# Patient Record
Sex: FEMALE | Race: WHITE | NOT HISPANIC OR LATINO | Employment: FULL TIME | URBAN - METROPOLITAN AREA
[De-identification: names, ages, dates, MRNs, and addresses within clinical notes are randomized per-mention and may not be internally consistent; named-entity substitution may affect disease eponyms.]

---

## 2024-01-25 ENCOUNTER — EVALUATION (OUTPATIENT)
Dept: PHYSICAL THERAPY | Facility: CLINIC | Age: 29
End: 2024-01-25
Payer: COMMERCIAL

## 2024-01-25 VITALS — DIASTOLIC BLOOD PRESSURE: 70 MMHG | SYSTOLIC BLOOD PRESSURE: 120 MMHG

## 2024-01-25 DIAGNOSIS — M54.6 ACUTE MIDLINE THORACIC BACK PAIN: Primary | ICD-10-CM

## 2024-01-25 PROCEDURE — 97110 THERAPEUTIC EXERCISES: CPT

## 2024-01-25 PROCEDURE — 97161 PT EVAL LOW COMPLEX 20 MIN: CPT

## 2024-01-25 NOTE — PROGRESS NOTES
PT Re-Evaluation     Today's date: 2024  Patient name: Key Pinto  : 1995  MRN: [id number]  Referring provider: Ilana Martin*  Dx:   Encounter Diagnosis     ICD-10-CM    1. Acute midline thoracic back pain  M54.6           Start Time: 1000  Stop Time: 1045  Total time in clinic (min): 45 minutes    Assessment  Assessment details: Key is a 27 yo female presenting to OP PT secondary to hx of T/S compression fracture with onset 24. Pt reports functional limitations as follows difficulty with prolonged standing. She is currently on lifting restrictions, and notes pain with lifting overhead, twisting/turning, and forward bending. Pt demonstrates postural deficits in sitting, T/S, L/S  AROM deficits, UE strength deficits. Pt would benefit from skilled PT to address stated deficits and improve return to PLOF, to improve ease with return to work activities.     Impairments: abnormal or restricted ROM, activity intolerance, impaired physical strength and pain with function    Symptom irritability: moderateUnderstanding of Dx/Px/POC: excellent  Goals  Pt will demonstrate [place] with progressive home exercise program to improve carryover and decrease recurrence of symptoms.  Pt will demonstrate 20% improvement in FOTO score to increase tolerance to functional activities   Pt will demonstrate 10-20 deg improvement in lumbar AROM to increase tolerance to reaching for items off the floor and improve ease with turning  Pt will demonstrate tolerance to lifting medium weights (up to 15Ibs) with min to no discomfort to improve ease with carrying groceries  Pt will demonstrate 5/5 in LE strength to allow for improved tolerance to prolonged amb/standing  Pt will demonstrate [place] with progressive home exercise program to assist with PT carry over and prevent recurrence of symptoms in the future   Pt will demonstrate 4+/5 in UE strength to allow for improved tolerance to lifting  items overhead.   Pt will demonstrate 4+/5 in periscap strength to improve posture in sitting.      Plan  Patient would benefit from: PT eval and skilled physical therapy  Planned modality interventions: TENS, cryotherapy, manual electrical stimulation, low level laser therapy and thermotherapy: hydrocollator packs  Planned therapy interventions: IASTM, joint mobilization, massage, manual therapy, neuromuscular re-education, orthotic fitting/training, patient education, strengthening, stretching, therapeutic activities, therapeutic exercise and flexibility  Frequency: 2x week  Duration in weeks: 8  Plan of Care beginning date: 2024  Plan of Care expiration date: 3/27/2024  Treatment plan discussed with: patient        Subjective Evaluation    History of Present Illness  Date of onset: 2024  Mechanism of injury: trauma  Mechanism of injury: Key presents to OP PT secondary to slip down stairs hitting her back twice on stairs on 24. X-ray revealed vertebral compression fracture T7-T8. Pt is currently under restrictions No lifting >25Ibs, no pushing, no pulling, no excessive twisting. Pt notes discomfort when forward bending, lifting dishes, and with prolonged standing. She additionally reports difficulty with holding items overhead and turning/twisting.  Pt is a  and is required to lift >25Ibs, currently she is out of work. 25 pt is given potential clearance to return but will obtain follow up X-ray. Pt trials heat/ice for discomfort and intermittently will take OTC medication. Pt has history of chronic low back unrelated to fall, right sided of LB. Denies any numbness/tingling or referral sx.  Quality of life: good    Patient Goals  Patient goals for therapy: increased strength, independence with ADLs/IADLs, decreased pain and return to work  Patient goal: Return  Pain  Current pain ratin  At best pain rating: 3  At worst pain ratin  Quality: pressure, pulling and  burning  Relieving factors: ice  Aggravating factors: sitting, standing, walking and lifting    Social Support  Lives in: one-story house  Lives with: spouse    Employment status: not working    Diagnostic Tests  X-ray: abnormal  Treatments  No previous or current treatments  Current treatment: medication      Objective     Palpation   Left   Tenderness of the middle trapezius and thoracic paraspinals.     Right   Tenderness of the middle trapezius and thoracic paraspinals.     Active Range of Motion   Cervical/Thoracic Spine       Thoracic    Left lateral flexion:  with pain Restriction level: moderate  Right lateral flexion:  with pain Restriction level: maximal  Left rotation:  Restriction level: moderate  Right rotation:  with pain Restriction level: moderate  Left Shoulder   Normal active range of motion    Right Shoulder   Normal active range of motion    Lumbar   Flexion:  Restriction level: moderate  Extension:  Restriction level: moderate  Left lateral flexion:  Restriction level: minimal  Right lateral flexion:  Restriction level: minimal  Left rotation:  Restriction level: minimal  Right rotation:  Restriction level: minimal    Strength/Myotome Testing     Left Shoulder     Planes of Motion   Flexion: 4   Extension: 4   Abduction: 4   External rotation at 90°: 4   External rotation BTH: 4   Internal rotation at 90°: 4   Internal rotation BTB: 4     Isolated Muscles   Biceps: 4   Lower trapezius: 3+   Middle trapezius: 4-   Rhomboids: 4-   Triceps: 4     Right Shoulder     Planes of Motion   Flexion: 4   Extension: 4   Abduction: 4   External rotation at 90°: 4   External rotation BTH: 4   Internal rotation at 90°: 4   Internal rotation BTB: 4     Isolated Muscles   Biceps: 4   Lower trapezius: 3+   Middle trapezius: 4-   Rhomboids: 4-   Triceps: 4              Precautions: History reviewed. No pertinent past medical history. T7-T8 compression fracture       Manuals 1/25            P to A L/S                           STM  paraspinalis                          Neuro Re-Ed             TA hold 10''x10            TA 90/90 hold with alt arms 5''x10             TA hold with MB flex             Dead bug             Wall Ts, Ys  2x10            Jess pose             Quad donkey kicks             Quad Fire hydtrant                                                                 Ther Ex             UBE or BIke             Ball roll outs in sitting             Tband Ws             LTR             Bilateral ER/no monies             H abduction              Bridges             SLR             Clamshells             Tband H,M,L rows             Quadruped upper trunk rotation                                        Ther Activity                                       Gait Training                                       Modalities

## 2024-01-25 NOTE — LETTER
2024      No Recipients    Patient: Key Pinto   YOB: 1995   Date of Visit: 2024     Encounter Diagnosis     ICD-10-CM    1. Acute midline thoracic back pain  M54.6           Dear Dr. Martin:    Thank you for your recent referral of Key Pinto. Please review the attached evaluation summary from Key's recent visit.     Please verify that you agree with the plan of care by signing the attached order.     If you have any questions or concerns, please do not hesitate to call.     I sincerely appreciate the opportunity to share in the care of one of your patients and hope to have another opportunity to work with you in the near future.       Sincerely,    Janna Lopez, PT      Referring Provider:      I certify that I have read the below Plan of Care and certify the need for these services furnished under this plan of treatment while under my care.                    CHESTER Fletcher  [address] [address]  Via Fax: [phone]          PT Re-Evaluation     Today's date: 2024  Patient name: Key Pinto  : 1995  MRN: [id number]  Referring provider: Ilana Martin*  Dx:   Encounter Diagnosis     ICD-10-CM    1. Acute midline thoracic back pain  M54.6           Start Time: 1000  Stop Time: 1045  Total time in clinic (min): 45 minutes    Assessment  Assessment details: Key is a 29 yo female presenting to OP PT secondary to hx of T/S compression fracture with onset 24. Pt reports functional limitations as follows difficulty with prolonged standing. She is currently on lifting restrictions, and notes pain with lifting overhead, twisting/turning, and forward bending. Pt demonstrates postural deficits in sitting, T/S, L/S  AROM deficits, UE strength deficits. Pt would benefit from skilled PT to address stated deficits and improve return to PLOF, to improve ease with return to work activities.     Impairments:  abnormal or restricted ROM, activity intolerance, impaired physical strength and pain with function    Symptom irritability: moderateUnderstanding of Dx/Px/POC: excellent  Goals  Pt will demonstrate [place] with progressive home exercise program to improve carryover and decrease recurrence of symptoms.  Pt will demonstrate 20% improvement in FOTO score to increase tolerance to functional activities   Pt will demonstrate 10-20 deg improvement in lumbar AROM to increase tolerance to reaching for items off the floor and improve ease with turning  Pt will demonstrate tolerance to lifting medium weights (up to 15Ibs) with min to no discomfort to improve ease with carrying groceries  Pt will demonstrate 5/5 in LE strength to allow for improved tolerance to prolonged amb/standing  Pt will demonstrate [place] with progressive home exercise program to assist with PT carry over and prevent recurrence of symptoms in the future   Pt will demonstrate 4+/5 in UE strength to allow for improved tolerance to lifting items overhead.   Pt will demonstrate 4+/5 in periscap strength to improve posture in sitting.      Plan  Patient would benefit from: PT eval and skilled physical therapy  Planned modality interventions: TENS, cryotherapy, manual electrical stimulation, low level laser therapy and thermotherapy: hydrocollator packs  Planned therapy interventions: IASTM, joint mobilization, massage, manual therapy, neuromuscular re-education, orthotic fitting/training, patient education, strengthening, stretching, therapeutic activities, therapeutic exercise and flexibility  Frequency: 2x week  Duration in weeks: 8  Plan of Care beginning date: 1/25/2024  Plan of Care expiration date: 3/27/2024  Treatment plan discussed with: patient        Subjective Evaluation    History of Present Illness  Date of onset: 1/8/2024  Mechanism of injury: trauma  Mechanism of injury: Key presents to OP PT secondary to slip down stairs  hitting her back twice on stairs on 24. X-ray revealed vertebral compression fracture T7-T8. Pt is currently under restrictions No lifting >25Ibs, no pushing, no pulling, no excessive twisting. Pt notes discomfort when forward bending, lifting dishes, and with prolonged standing. She additionally reports difficulty with holding items overhead and turning/twisting.  Pt is a  and is required to lift >25Ibs, currently she is out of work. 25 pt is given potential clearance to return but will obtain follow up X-ray. Pt trials heat/ice for discomfort and intermittently will take OTC medication. Pt has history of chronic low back unrelated to fall, right sided of LB. Denies any numbness/tingling or referral sx.  Quality of life: good    Patient Goals  Patient goals for therapy: increased strength, independence with ADLs/IADLs, decreased pain and return to work  Patient goal: Return  Pain  Current pain ratin  At best pain rating: 3  At worst pain ratin  Quality: pressure, pulling and burning  Relieving factors: ice  Aggravating factors: sitting, standing, walking and lifting    Social Support  Lives in: one-story house  Lives with: spouse    Employment status: not working    Diagnostic Tests  X-ray: abnormal  Treatments  No previous or current treatments  Current treatment: medication      Objective     Palpation   Left   Tenderness of the middle trapezius and thoracic paraspinals.     Right   Tenderness of the middle trapezius and thoracic paraspinals.     Active Range of Motion   Cervical/Thoracic Spine       Thoracic    Left lateral flexion:  with pain Restriction level: moderate  Right lateral flexion:  with pain Restriction level: maximal  Left rotation:  Restriction level: moderate  Right rotation:  with pain Restriction level: moderate  Left Shoulder   Normal active range of motion    Right Shoulder   Normal active range of motion    Lumbar   Flexion:  Restriction level:  moderate  Extension:  Restriction level: moderate  Left lateral flexion:  Restriction level: minimal  Right lateral flexion:  Restriction level: minimal  Left rotation:  Restriction level: minimal  Right rotation:  Restriction level: minimal    Strength/Myotome Testing     Left Shoulder     Planes of Motion   Flexion: 4   Extension: 4   Abduction: 4   External rotation at 90°: 4   External rotation BTH: 4   Internal rotation at 90°: 4   Internal rotation BTB: 4     Isolated Muscles   Biceps: 4   Lower trapezius: 3+   Middle trapezius: 4-   Rhomboids: 4-   Triceps: 4     Right Shoulder     Planes of Motion   Flexion: 4   Extension: 4   Abduction: 4   External rotation at 90°: 4   External rotation BTH: 4   Internal rotation at 90°: 4   Internal rotation BTB: 4     Isolated Muscles   Biceps: 4   Lower trapezius: 3+   Middle trapezius: 4-   Rhomboids: 4-   Triceps: 4              Precautions: History reviewed. No pertinent past medical history. T7-T8 compression fracture       Manuals 1/25            P to A L/S                          STM  paraspinalis                          Neuro Re-Ed             TA hold 10''x10            TA 90/90 hold with alt arms 5''x10             TA hold with MB flex             Dead bug             Wall Ts, Ys  2x10            Jess pose             Quad donkey kicks             Quad Fire hydtrant                                                                 Ther Ex             UBE or BIke             Ball roll outs in sitting             Tband Ws             LTR             Bilateral ER/no monies             H abduction              Bridges             SLR             Clamshells             Tband H,M,L rows             Quadruped upper trunk rotation                                        Ther Activity                                       Gait Training                                       Modalities

## 2024-01-30 ENCOUNTER — OFFICE VISIT (OUTPATIENT)
Dept: PHYSICAL THERAPY | Facility: CLINIC | Age: 29
End: 2024-01-30
Payer: COMMERCIAL

## 2024-01-30 DIAGNOSIS — M54.6 ACUTE MIDLINE THORACIC BACK PAIN: Primary | ICD-10-CM

## 2024-01-30 PROCEDURE — 97110 THERAPEUTIC EXERCISES: CPT

## 2024-01-30 PROCEDURE — 97112 NEUROMUSCULAR REEDUCATION: CPT

## 2024-01-30 NOTE — PROGRESS NOTES
Daily Note     Today's date: 2024  Patient name: Key Pinto  : 1995  MRN: [id number]  Referring provider: Ilana Martin*  Dx:   Encounter Diagnosis     ICD-10-CM    1. Acute midline thoracic back pain  M54.6           Start Time: 1527  Stop Time: 1610  Total time in clinic (min): 43 minutes    Subjective: Pt reports mild elevation in muscular soreness since beginning exercise, but denies any sig pain. She reports no pain at the start of her session.     Objective: See treatment diary below      Assessment:  Key tolerated treatment well with consistent cuing throughout. TE's were performed with increased reps and increased resistance. New TE's were demonstrated with proper technique, and tolerated well. Following treatment, the patient demonstrated fatigue post treatment, exhibited good technique with therapeutic exercises, and would benefit from continued PT.         Plan: Continue per plan of care.      Precautions: History reviewed. No pertinent past medical history. T7-T8 compression fracture   Date           Visit # 1 2          FOTO done           Re-eval                  Manuals            P to A L/S                          STM  paraspinalis  FH L medial scap                        Neuro Re-Ed             TA hold 10''x10 10''x15           TA 90/90 hold with alt arms 5''x10  5''x20           TA hold with MB flex             Dead bug             Wall Ts, Ys  2x10 Prone today 210                        Quad donkey kicks             Quad Fire hydtrant                                                                 Ther Ex             UBE or BIke  3'/3'           Ball roll outs in sitting             Tband Ws  GTB  2x10           LTR  10''x10           Bilateral ER/no monies  [id number]            H abduction              Bridges  5''x20           SLR             Clamshells             Tband H,M,L rows  Mid BTB 2x10                                                   Ther Activity                                       Gait Training                                       Modalities

## 2024-02-01 ENCOUNTER — OFFICE VISIT (OUTPATIENT)
Dept: PHYSICAL THERAPY | Facility: CLINIC | Age: 29
End: 2024-02-01
Payer: COMMERCIAL

## 2024-02-01 DIAGNOSIS — M54.6 ACUTE MIDLINE THORACIC BACK PAIN: Primary | ICD-10-CM

## 2024-02-01 PROCEDURE — 97112 NEUROMUSCULAR REEDUCATION: CPT

## 2024-02-01 PROCEDURE — 97110 THERAPEUTIC EXERCISES: CPT

## 2024-02-01 NOTE — PROGRESS NOTES
Daily Note     Today's date: 2024  Patient name: Key Pinto  : 1995  MRN: [id number]  Referring provider: Ilana Martin*  Dx:   Encounter Diagnosis     ICD-10-CM    1. Acute midline thoracic back pain  M54.6           Start Time: 1400  Stop Time: 1440  Total time in clinic (min): 40 minutes    Subjective: Pt reports continued elevated soreness, along midline and between shoulder blades after exercise. She reports sx are around 5/10 today, which subsided from yesterday.      Objective: See treatment diary below      Assessment:  Key tolerated treatment well with consistent cuing throughout. TE's were performed with the same resistance and reps. No new TE's were performed today. Following treatment, the patient demonstrated fatigue post treatment, exhibited good technique with therapeutic exercises, and would benefit from continued PT.         Plan: Continue per plan of care.      Precautions: History reviewed. No pertinent past medical history. T7-T8 compression fracture   Date          Visit # 1 2 3         FOTO done           Re-eval                  Manuals           P to A L/S                          STM  paraspinalis  FH L medial scap FH                       Neuro Re-Ed             TA hold 10''x10 10''x15 10''x15          TA 90/90 hold with alt arms 5''x10  5''x20 5''x20          TA hold with MB flex             Dead bug             Wall Ts, Ys  2x10 Prone today 2x10 Wall today 2x10 ea                       Quad donkey kicks             Quad Fire hydtrant             TA hold with [place]   2x10                                                 Ther Ex             UBE or BIke  3'/3' 3'/3'          Ball roll outs in sitting             Tband Ws  GTB  2x10 GTB 2x10          LTR  10''x10 5-10''x10          Bilateral ER/no monies  [id number]  GTB 2x10 supine          H abduction              Bridges  5''x20 5''x20          SLR             Clamshells              Tband H,M,L rows  Mid BTB 2x10 Mid/low BTB 2x10 ea                                                 Ther Activity                                       Gait Training                                       Modalities

## 2024-02-06 ENCOUNTER — OFFICE VISIT (OUTPATIENT)
Dept: PHYSICAL THERAPY | Facility: CLINIC | Age: 29
End: 2024-02-06
Payer: COMMERCIAL

## 2024-02-06 DIAGNOSIS — M54.6 ACUTE MIDLINE THORACIC BACK PAIN: Primary | ICD-10-CM

## 2024-02-06 PROCEDURE — 97112 NEUROMUSCULAR REEDUCATION: CPT

## 2024-02-06 PROCEDURE — 97110 THERAPEUTIC EXERCISES: CPT

## 2024-02-06 NOTE — PROGRESS NOTES
Daily Note     Today's date: 2024  Patient name: Key Pinto  : 1995  MRN: [id number]  Referring provider: Ilana Martin*  Dx:   Encounter Diagnosis     ICD-10-CM    1. Acute midline thoracic back pain  M54.6           Start Time: 1400  Stop Time: 1445  Total time in clinic (min): 45 minutes    Subjective: Pt followed up with MD to obtain clearance for activities and work related activities. She reports job requires physical activities      Objective: See treatment diary below      Assessment: Tolerated treatment well. Exercises were progressed for added resistance with vignesh this session and this was tolerated well. PT trials cupping this session, with verbal agreement from patient to complete. Skin check was completed and pt tolerates this well. Patient demonstrated fatigue post treatment, exhibited good technique with therapeutic exercises, and would benefit from continued PT      Plan: Continue per plan of care.      Precautions: History reviewed. No pertinent past medical history. T7-T8 compression fracture   Date          Visit # 1 2 3 4         FOTO done            Re-eval                   Manuals          P to A L/S                          STM  paraspinalis  FH L medial scap  FH         Mcpt     5 min          Neuro Re-Ed             TA hold 10''x10 10''x15 10''x15 NV         TA 90/90 hold with alt arms 5''x10  5''x20 5''x20 NV         TA hold with MB flex             Dead bug             Wall Ts, Ys  2x10 Prone today 2x10 Wall today 2x10 ea Prone 2x10 ea                      Quad donkey kicks             Quad Fire hydtrant             TA hold with [place]   2x10 NV                                                Ther Ex             UBE or BIke  3'/3' 3'/3' 3'/3'         Ball roll outs in sitting             Tband Ws  GTB  2x10 GTB 2x10 5# 2x10 Vignesh         LTR  10''x10 5-10''x10 Home         Bilateral ER/no monies  [id number]  GTB 2x10 supine ER  Vignesh  5# b/l         H abduction              Bridges  5''x20 5''x20 NV         SLR             [place]             Tband H,M,L rows  Mid BTB 2x10 Mid/low BTB 2x10 ea Vignesh 15# rows  10# ext 2x10 ea          Pall off press    5# 1x10 ea                                   Ther Activity                                       Gait Training                                       Modalities

## 2024-02-08 ENCOUNTER — OFFICE VISIT (OUTPATIENT)
Dept: PHYSICAL THERAPY | Facility: CLINIC | Age: 29
End: 2024-02-08
Payer: COMMERCIAL

## 2024-02-08 DIAGNOSIS — M54.6 ACUTE MIDLINE THORACIC BACK PAIN: Primary | ICD-10-CM

## 2024-02-08 PROCEDURE — 97110 THERAPEUTIC EXERCISES: CPT

## 2024-02-08 PROCEDURE — 97112 NEUROMUSCULAR REEDUCATION: CPT

## 2024-02-08 NOTE — PROGRESS NOTES
"Daily Note     Today's date: 2024  Patient name: Key Pinto  : 1995  MRN: [id number]  Referring provider: Ilana Martin*  Dx:   Encounter Diagnosis     ICD-10-CM    1. Acute midline thoracic back pain  M54.6           Start Time: 1530  Stop Time: 1620  Total time in clinic (min): 50 minutes    Subjective: Patient states that mid back feel sore where she had the original injury.      Objective: See treatment diary below      Assessment: Tolerated treatment well. Good core control with cues this visit. Good carry over with cues for scapular control. Some core and mid trap fatigue post session. Patient would benefit from continued PT to improve T-S strength.    Plan: Continue per plan of care.  GIVE FOTO NV.     Precautions: History reviewed. No pertinent past medical history. T7-T8 compression fracture   Date         Visit # 1 2 3 4 5        FOTO done            Re-eval                   Manuals         P to A L/S                          STM  paraspinalis  FH L medial scap  FH L medial scap, L T-S paraspinals        Mcpt     5 min          Neuro Re-Ed             TA hold 10''x10 10''x15 10''x15 NV 10\"x20        TA 90/90 hold with alt arms 5''x10  5''x20 5''x20 NV x20        TA hold with MB flex             Dead bug             Wall Ts, Ys  2x10 Prone today 2x10 Wall today 2x10 ea Prone 2x10 ea Prone 2x10 ea                     Quad donkey kicks             Quad Fire hydtrant             TA hold with March   2x10 NV 2x10, 5\"                                               Ther Ex             UBE or BIke  3'/3' 3'/3' 3'/3' 3'/3'        Ball roll outs in sitting             Tband Ws  GTB  2x10 GTB 2x10 5# 2x10 [place]         LTR  10''x10 5-10''x10 Home NP        Bilateral ER/no monies  [id number]  GTB 2x10 supine ER Vignesh  5# b/l         H abduction              Bridges  5''x20 5''x20 NV         SLR             Clamshells             Tband H,M,L rows  " Mid BTB 2x10 Mid/low BTB 2x10 ea [place] 15# rows  10# ext 2x10 ea          Pall off press    5# 1x10 ea                                   Ther Activity                                       Gait Training                                       Modalities

## 2024-02-13 ENCOUNTER — APPOINTMENT (OUTPATIENT)
Dept: PHYSICAL THERAPY | Facility: CLINIC | Age: 29
End: 2024-02-13
Payer: COMMERCIAL

## 2024-02-13 ENCOUNTER — OFFICE VISIT (OUTPATIENT)
Dept: PHYSICAL THERAPY | Facility: CLINIC | Age: 29
End: 2024-02-13
Payer: COMMERCIAL

## 2024-02-13 DIAGNOSIS — M54.6 ACUTE MIDLINE THORACIC BACK PAIN: Primary | ICD-10-CM

## 2024-02-13 PROCEDURE — 97112 NEUROMUSCULAR REEDUCATION: CPT | Performed by: PHYSICAL THERAPIST

## 2024-02-13 PROCEDURE — 97140 MANUAL THERAPY 1/> REGIONS: CPT | Performed by: PHYSICAL THERAPIST

## 2024-02-13 PROCEDURE — 97110 THERAPEUTIC EXERCISES: CPT | Performed by: PHYSICAL THERAPIST

## 2024-02-13 NOTE — PROGRESS NOTES
"Daily Note     Today's date: 2024  Patient name: Key Pinto  : 1995  MRN: [id number]  Referring provider: Ilana Martin*  Dx:   Encounter Diagnosis     ICD-10-CM    1. Acute midline thoracic back pain  M54.6                      Subjective: Felt sore after last session, but doing pretty good overall. Feels like she can do more, but is nervous that she'll do too much.       Objective: See treatment diary below      Assessment: Tolerated treatment well. Patient would benefit from continued PT. Tolerated session well. Mild pain intermittently, but felt better post tx. Did well w/ paloff press      Plan: Continue per plan of care.      Precautions: History reviewed. No pertinent past medical history. T7-T8 compression fracture   Date        Visit # 1 2 3 4 5        FOTO done            Re-eval                   Manuals        P to A L/S                          STM  paraspinalis  FH L medial scap  FH L medial scap, L T-S paraspinals L medial scap, L T-S paraspinals       Mcpt     5 min          Neuro Re-Ed             TA hold 10''x10 10''x15 10''x15 NV 10\"x20 10''x15       TA 90/90 hold with alt arms 5''x10  5''x20 5''x20 NV x20 x20       TA hold with MB flex             Dead bug             Wall Ts, Ys  2x10 Prone today 2x10 Wall today 2x10 ea Prone 2x10 ea Prone 2x10 ea Prone 2x10 ea                    Quad donkey kicks             Quad Fire hydtrant             TA hold with March   2x10 NV 2x10, 5\" 2x10, 5\"                                              Ther Ex             UBE or BIke  3'/3' 3'/3' 3'/3' 3'/3' 3'/3'       Ball roll outs in sitting             Tband Ws  GTB  2x10 GTB 2x10 5# 2x10 Vignesh         LTR  10''x10 5-10''x10 Home NP        Bilateral ER/no monies  [id number]  GTB 2x10 supine ER [place]  5# b/l  GTB 2x10 standing       H abduction              Bridges  5''x20 5''x20 NV         SLR             Clamshells             Tband " H,M,L rows  Mid BTB 2x10 Mid/low BTB 2x10 ea [place] 15# rows  10# ext 2x10 ea   Vignesh 10# rows  10# ext 2x10 ea        Pall off press    5# 1x10 ea  5# 2x10 ea                                 Ther Activity                                       Gait Training                                       Modalities

## 2024-02-15 ENCOUNTER — EVALUATION (OUTPATIENT)
Dept: PHYSICAL THERAPY | Facility: CLINIC | Age: 29
End: 2024-02-15
Payer: COMMERCIAL

## 2024-02-15 DIAGNOSIS — M54.6 ACUTE MIDLINE THORACIC BACK PAIN: Primary | ICD-10-CM

## 2024-02-15 PROCEDURE — 97112 NEUROMUSCULAR REEDUCATION: CPT

## 2024-02-15 PROCEDURE — 97110 THERAPEUTIC EXERCISES: CPT

## 2024-02-15 NOTE — PROGRESS NOTES
PT Re-Evaluation     Today's date: 2/15/2024  Patient name: Key Pinto  : 1995  MRN: [id number]  Referring provider: Ilana Martin*  Dx:   Encounter Diagnosis     ICD-10-CM    1. Acute midline thoracic back pain  M54.6           Start Time: 1530  Stop Time: 1615  Total time in clinic (min): 45 minutes    Assessment  Assessment details: Key is a 27 yo female presenting to OP PT secondary to hx of T/S compression fracture with onset 24. Pt reports she has tried lifting trash cans at home, and additionally note difficulty with lifting up to 10ibs (lifting her vacuum). She reports she is able to standing/walk for up to 20 minutes before onset of pain. Pt demonstrates 1/2 grade improvement in B/L UE strength and general improvement in thoracic movement compared to initial Eval. She does continue to present with thoracic extension AROM deficits, Right rotation and Left side bending AROM deficit Pt would benefit from skilled PT to address stated deficits and improve return to PLOF, to improve ease with return to work activities.     Impairments: abnormal or restricted ROM, activity intolerance, impaired physical strength and pain with function    FUNCTIONAL TESTING 2/15/24 RE:  Pushing/pulling  10#, 2x10 reps  Standing 20 min before onset of pain  Repeated forward bending 20 reps  Sustained forward bending NA   Lifting from Ground to waist 15 #, 10 reps; 10#, 20 reps (elevated pain with 15# after 6 reps)  Lifting from Ground to shoulder 10-15#, 10-20 reps (pain with 15# after 5 reps)  Overhead lifting 10-15#, 10-20 reps (elevated pain after 5 reps)     Symptom irritability: moderateUnderstanding of Dx/Px/POC: excellent  Goals  Pt will demonstrate [place] with progressive home exercise program to improve carryover and decrease recurrence of symptoms.  Pt will demonstrate 20% improvement in FOTO score to increase tolerance to functional activities   Pt will demonstrate 10-20 deg  improvement in lumbar AROM to increase tolerance to reaching for items off the floor and improve ease with turning  Pt will demonstrate tolerance to lifting medium weights (up to 15Ibs) with min to no discomfort to improve ease with carrying groceries  Pt will demonstrate 5/5 in LE strength to allow for improved tolerance to prolonged amb/standing  Pt will demonstrate [place] with progressive home exercise program to assist with PT carry over and prevent recurrence of symptoms in the future   Pt will demonstrate 4+/5 in UE strength to allow for improved tolerance to lifting items overhead.   Pt will demonstrate 4+/5 in periscap strength to improve posture in sitting.      Plan  Patient would benefit from: PT eval and skilled physical therapy  Planned modality interventions: TENS, cryotherapy, manual electrical stimulation, low level laser therapy and thermotherapy: hydrocollator packs  Planned therapy interventions: IASTM, joint mobilization, massage, manual therapy, neuromuscular re-education, orthotic fitting/training, patient education, strengthening, stretching, therapeutic activities, therapeutic exercise and flexibility  Frequency: 2x week  Duration in weeks: 8  Plan of Care beginning date: 1/25/2024  Plan of Care expiration date: 3/27/2024  Treatment plan discussed with: patient        Subjective Evaluation  RE 2/15/24  Key has been in therapy for a total of 7 visits beginning 1/25/24 to 2/15/24 for treatment of thoracic back pain. She reports overall improvement in tolerance to daily activities such as vacuuming, putting away dishes, and reaching overhead with less discomfort. She continues to not discomfort with repeated or sustained flexion, and lifting >15#.     History of Present Illness  Date of onset: 1/8/2024  Mechanism of injury: trauma  Mechanism of injury: Key presents to OP PT secondary to slip down stairs hitting her back twice on stairs on 1/08/24. X-ray revealed vertebral  compression fracture T7-T8. Pt is currently under restrictions No lifting >25Ibs, no pushing, no pulling, no excessive twisting. Pt notes discomfort when forward bending, lifting dishes, and with prolonged standing. She additionally reports difficulty with holding items overhead and turning/twisting.  Pt is a  and is required to lift >25Ibs, currently she is out of work. 25 pt is given potential clearance to return but will obtain follow up X-ray. Pt trials heat/ice for discomfort and intermittently will take OTC medication. Pt has history of chronic low back unrelated to fall, right sided of LB. Denies any numbness/tingling or referral sx.  Quality of life: good    Patient Goals  Patient goals for therapy: increased strength, independence with ADLs/IADLs, decreased pain and return to work  Patient goal: Return  Pain  Current pain ratin  At best pain rating: 3  At worst pain ratin  Quality: pressure, pulling and burning  Relieving factors: ice  Aggravating factors: sitting, standing, walking and lifting        Objective     Palpation   Left   Tenderness of the middle trapezius and thoracic paraspinals.     Right   Tenderness of the middle trapezius and thoracic paraspinals.     Active Range of Motion   Cervical/Thoracic Spine       Thoracic  Flexion WNL  Extension mod  Left lateral flexion:  with pain Restriction level: mod  Right lateral flexion:  with pain Restriction level: min  Left rotation:  Restriction level: moderate  Right rotation:  with pain Restriction level: moderate  Left Shoulder   Normal active range of motion    Right Shoulder   Normal active range of motion    Lumbar   Flexion:  Restriction level: WNL  Extension:  Restriction level: moderate  Left lateral flexion:  Restriction level: minimal  Right lateral flexion:  Restriction level: minimal  Left rotation:  Restriction level: minimal   Right rotation:  Restriction level: minimal    Strength/Myotome Testing     Left  "Shoulder     Planes of Motion   Flexion: 4+  Extension: 4+   Abduction: 4 +  External rotation at 90°: 4   External rotation BTH: 4   Internal rotation at 90°: 4   Internal rotation BTB: 4     Isolated Muscles   Biceps: 4   Lower trapezius: 4-  Middle trapezius: 4   Rhomboids: 4  Triceps: 4+    Right Shoulder     Planes of Motion   Flexion: 4+  Extension: 4+  Abduction: 4+  External rotation at 90°: 4   External rotation BTH: 4   Internal rotation at 90°: 4   Internal rotation BTB: 4     Isolated Muscles   Biceps: 4   Lower trapezius: 3+   Middle trapezius: 4-   Rhomboids: 4-   Triceps: 4     Flowsheet Rows      Flowsheet Row Most Recent Value   PT/OT G-Codes    Current Score 58   Projected Score 78        JOB REQUIREMENTS:  Standing 8-10 hours   Repeated unassisted lifting of 20Ibs overhead   Pushing/pulling 20Ibs    FUNCTIONAL TESTING 2/15/24 RE:    Pushing/pulling  10#, 2x10 reps  Standing 20 min  Repeated forward bending 20 reps  Sustained forward bending NA   Lifting from Ground to waist 15 #, 10 reps; 10#, 20 reps   Lifting from Ground to shoulder 10-15#, 10-20 reps (pain with 15# after 5 reps)  Overhead lifting 10-15#, 10-20 reps     Plan: Continue per plan of care.      Precautions: History reviewed. No pertinent past medical history. T7-T8 compression fracture   Date 1/25 1/30 2/1 2/6 2/8 2/13 2/15      Visit # 1 2 3 4 5 6 7      FOTO done      X      Re-eval       X            Manuals 1/25 1/30 2/1 2/6 2/8 2/13 2/15      P to A L/S                          STM  paraspinalis  FH L medial scap  FH L medial scap, L T-S paraspinals L medial scap, L T-S paraspinals NP      MFD    5 min          Neuro Re-Ed             TA hold 10''x10 10''x15 10''x15 NV 10\"x20 10''x15 NP      TA 90/90 hold with alt arms 5''x10  5''x20 5''x20 NV x20 x20 NV      TA hold with MB flex             Dead bug             Wall Ts, Ys  2x10 Prone today 2x10 Wall today 2x10 ea Prone 2x10 ea Prone 2x10 ea Prone 2x10 ea Prone 3 x10           " "         Quad donkey kicks             Quad Fire hydtrant             TA hold with March   2x10 NV 2x10, 5\" 2x10, 5\"                                              Ther Ex             UBE or BIke  3'/3' 3'/3' 3'/3' 3'/3' 3'/3' 3'/3'      Ball roll outs in sitting             Tband Ws  GTB  2x10 GTB 2x10 5# 2x10 [place]         LTR  10''x10 5-10''x10 Home NP        Bilateral ER/no monies  [id number]  GTB 2x10 supine ER [place]  5# b/l  GTB 2x10 standing ER Vignesh  5# b/l      H abduction              Bridges  5''x20 5''x20 NV         SLR             Clamshells             Tband H,M,L rows  Mid BTB 2x10 Mid/low BTB 2x10 ea [place] 15# rows  10# ext 2x10 ea   [place] 10# rows  10# ext 2x10 ea  Vignesh 15# rows  10# ext 2x10 ea       Pall off press    5# 1x10 ea  5# 2x10 ea Chest NAYLOR press 8#      T/S AROM ext, Sb, rot       5''x10      OH lift, Floor to chest       10#-15#      Ther Activity                                       Gait Training                                       Modalities                                                 "

## 2024-02-15 NOTE — LETTER
February 15, 2024      No Recipients    Patient: Key Pinto   YOB: 1995   Date of Visit: 2/15/2024     Encounter Diagnosis     ICD-10-CM    1. Acute midline thoracic back pain  M54.6           Dear Dr. Martin:    Thank you for your recent referral of Key Pinto. Please review the attached evaluation summary from Key's recent visit.     Please verify that you agree with the plan of care by signing the attached order.     If you have any questions or concerns, please do not hesitate to call.     I sincerely appreciate the opportunity to share in the care of one of your patients and hope to have another opportunity to work with you in the near future.       Sincerely,    Janna Lopez, PT      Referring Provider:      I certify that I have read the below Plan of Care and certify the need for these services furnished under this plan of treatment while under my care.                    CHESTER Fletcher  [address] [address]  Via Fax: [phone]          PT Re-Evaluation     Today's date: 2/15/2024  Patient name: Key Pinto  : 1995  MRN: [id number]  Referring provider: Ilana Martin*  Dx:   Encounter Diagnosis     ICD-10-CM    1. Acute midline thoracic back pain  M54.6           Start Time: 1530  Stop Time: 1615  Total time in clinic (min): 45 minutes    Assessment  Assessment details: Key is a 29 yo female presenting to OP PT secondary to hx of T/S compression fracture with onset 24. Pt reports she has tried lifting trash cans at home, and additionally note difficulty with lifting up to 10ibs (lifting her vacuum). She reports she is able to standing/walk for up to 20 minutes before onset of pain. Pt demonstrates 1/2 grade improvement in LE strength and general thoracic movement compared to initial Eval. She does continue to present with thoracic extension AROM deficits, Right rotation and Left side bending AROM deficit Pt  would benefit from skilled PT to address stated deficits and improve return to PLOF, to improve ease with return to work activities.     Impairments: abnormal or restricted ROM, activity intolerance, impaired physical strength and pain with function    Symptom irritability: moderateUnderstanding of Dx/Px/POC: excellent  Goals  Pt will demonstrate [place] with progressive home exercise program to improve carryover and decrease recurrence of symptoms.  Pt will demonstrate 20% improvement in FOTO score to increase tolerance to functional activities   Pt will demonstrate 10-20 deg improvement in lumbar AROM to increase tolerance to reaching for items off the floor and improve ease with turning  Pt will demonstrate tolerance to lifting medium weights (up to 15Ibs) with min to no discomfort to improve ease with carrying groceries  Pt will demonstrate 5/5 in LE strength to allow for improved tolerance to prolonged amb/standing  Pt will demonstrate [place] with progressive home exercise program to assist with PT carry over and prevent recurrence of symptoms in the future   Pt will demonstrate 4+/5 in UE strength to allow for improved tolerance to lifting items overhead.   Pt will demonstrate 4+/5 in periscap strength to improve posture in sitting.      Plan  Patient would benefit from: PT eval and skilled physical therapy  Planned modality interventions: TENS, cryotherapy, manual electrical stimulation, low level laser therapy and thermotherapy: hydrocollator packs  Planned therapy interventions: IASTM, joint mobilization, massage, manual therapy, neuromuscular re-education, orthotic fitting/training, patient education, strengthening, stretching, therapeutic activities, therapeutic exercise and flexibility  Frequency: 2x week  Duration in weeks: 8  Plan of Care beginning date: 1/25/2024  Plan of Care expiration date: 3/27/2024  Treatment plan discussed with: patient        Subjective Evaluation  RE  2/15/24  Key has been in therapy for a total of 7 visits beginning 24 to 2/15/24 for treatment of thoracic back pain. She reports overall improvement in tolerance to daily activities such as vacuuming, putting away dishes, and reaching overhead with less discomfort. She continues to not discomfort with repeated or sustained flexion, and lifting >15#.     History of Present Illness  Date of onset: 2024  Mechanism of injury: trauma  Mechanism of injury: Key presents to OP PT secondary to slip down stairs hitting her back twice on stairs on 24. X-ray revealed vertebral compression fracture T7-T8. Pt is currently under restrictions No lifting >25Ibs, no pushing, no pulling, no excessive twisting. Pt notes discomfort when forward bending, lifting dishes, and with prolonged standing. She additionally reports difficulty with holding items overhead and turning/twisting.  Pt is a  and is required to lift >25Ibs, currently she is out of work. 25 pt is given potential clearance to return but will obtain follow up X-ray. Pt trials heat/ice for discomfort and intermittently will take OTC medication. Pt has history of chronic low back unrelated to fall, right sided of LB. Denies any numbness/tingling or referral sx.  Quality of life: good    Patient Goals  Patient goals for therapy: increased strength, independence with ADLs/IADLs, decreased pain and return to work  Patient goal: Return  Pain  Current pain ratin  At best pain rating: 3  At worst pain ratin  Quality: pressure, pulling and burning  Relieving factors: ice  Aggravating factors: sitting, standing, walking and lifting        Objective     Palpation   Left   Tenderness of the middle trapezius and thoracic paraspinals.     Right   Tenderness of the middle trapezius and thoracic paraspinals.     Active Range of Motion   Cervical/Thoracic Spine       Thoracic  Flexion WNL  Extension mod  Left lateral flexion:  with pain  Restriction level: mod  Right lateral flexion:  with pain Restriction level: min  Left rotation:  Restriction level: moderate  Right rotation:  with pain Restriction level: moderate  Left Shoulder   Normal active range of motion    Right Shoulder   Normal active range of motion    Lumbar   Flexion:  Restriction level: WNL  Extension:  Restriction level: moderate  Left lateral flexion:  Restriction level: minimal  Right lateral flexion:  Restriction level: minimal  Left rotation:  Restriction level: minimal   Right rotation:  Restriction level: minimal    Strength/Myotome Testing     Left Shoulder     Planes of Motion   Flexion: 4+  Extension: 4+   Abduction: 4 +  External rotation at 90°: 4   External rotation BTH: 4   Internal rotation at 90°: 4   Internal rotation BTB: 4     Isolated Muscles   Biceps: 4   Lower trapezius: 4-  Middle trapezius: 4   Rhomboids: 4  Triceps: 4+    Right Shoulder     Planes of Motion   Flexion: 4+  Extension: 4+  Abduction: 4+  External rotation at 90°: 4   External rotation BTH: 4   Internal rotation at 90°: 4   Internal rotation BTB: 4     Isolated Muscles   Biceps: 4   Lower trapezius: 3+   Middle trapezius: 4-   Rhomboids: 4-   Triceps: 4     Flowsheet Rows      Flowsheet Row Most Recent Value   PT/OT G-Codes    Current Score 58   Projected Score 78        JOB REQUIREMENTS:  Standing 8-10 hours   Repeated unassisted lifting of 20Ibs overhead   Pushing/pulling 20Ibs    FUNCTIONAL TESTING 2/15/24 RE:    Pushing/pulling  10#, 2x10 reps  Standing 20 min  Repeated forward bending 20 reps  Sustained forward bending *** min   Lifting from Ground to waist 15 #, 10 reps; 10#, 20 reps   Lifting from Ground to shoulder 10-15#, 10-20 reps (pain with 15# after 5 reps)  Overhead lifting 10-15#, 10-20 reps     Plan: Continue per plan of care.      Precautions: History reviewed. No pertinent past medical history. T7-T8 compression fracture   Date 1/25 1/30 2/1 2/6 2/8 2/13 2/15      Visit # 1 2 3 4  "5 6 7      FOTO done      X      Re-eval       X            Manuals 1/25 1/30 2/1 2/6 2/8 2/13 2/15      P to A L/S                          STM  paraspinalis  FH L medial scap  FH L medial scap, L T-S paraspinals L medial scap, L T-S paraspinals NP      MFD    5 min          Neuro Re-Ed             TA hold 10''x10 10''x15 10''x15 NV 10\"x20 10''x15 NP      TA 90/90 hold with alt arms 5''x10  5''x20 5''x20 NV x20 x20 NV      TA hold with MB flex             Dead bug             Wall Ts, Ys  2x10 Prone today 2x10 Wall today 2x10 ea Prone 2x10 ea Prone 2x10 ea Prone 2x10 ea Prone 3 x10                    Quad donkey kicks             Quad Fire hydtrant             TA hold with March   2x10 NV 2x10, 5\" 2x10, 5\"                                              Ther Ex             UBE or BIke  3'/3' 3'/3' 3'/3' 3'/3' 3'/3' 3'/3'      Ball roll outs in sitting             Tband Ws  GTB  2x10 GTB 2x10 5# 2x10 Vignesh         LTR  10''x10 5-10''x10 Home NP        Bilateral ER/no monies  [id number]  GTB 2x10 supine ER [place]  5# b/l  GTB 2x10 standing ER [place]  5# b/l      H abduction              Bridges  5''x20 5''x20 NV         SLR             Clamshells             Tband H,M,L rows  Mid BTB 2x10 Mid/low BTB 2x10 ea Vignesh 15# rows  10# ext 2x10 ea   Vignesh 10# rows  10# ext 2x10 ea  [place] 15# rows  10# ext 2x10 ea       Pall off press    5# 1x10 ea  5# 2x10 ea Chest NAYLOR press 8#      T/S AROM ext, Sb, rot       5''x10      OH lift, Floor to chest       10#-15#      Ther Activity                                       Gait Training                                       Modalities                                                                    "

## 2024-02-20 ENCOUNTER — OFFICE VISIT (OUTPATIENT)
Dept: PHYSICAL THERAPY | Facility: CLINIC | Age: 29
End: 2024-02-20
Payer: COMMERCIAL

## 2024-02-20 DIAGNOSIS — M54.6 ACUTE MIDLINE THORACIC BACK PAIN: Primary | ICD-10-CM

## 2024-02-20 PROCEDURE — 97110 THERAPEUTIC EXERCISES: CPT

## 2024-02-20 PROCEDURE — 97112 NEUROMUSCULAR REEDUCATION: CPT

## 2024-02-20 NOTE — PROGRESS NOTES
"Daily Note     Today's date: 2024  Patient name: Key Pinto  : 1995  MRN: [id number]  Referring provider: Ilana Martin*  Dx:   Encounter Diagnosis     ICD-10-CM    1. Acute midline thoracic back pain  M54.6                      Subjective: Pt reports increase soreness in mid back after RE, but reports soreness subsided after 1-2 days. She denies any sig pain at the start of her session.       Objective: See treatment diary below      Assessment: Austin tolerated treatment well with consistent cuing throughout. TE's were performed with increased reps and increased resistance. New TE's were demonstrated with proper technique, and tolerated well. Following treatment, the patient demonstrated fatigue post treatment, exhibited good technique with therapeutic exercises, and would benefit from continued PT.         Plan: Continue per plan of care.      Plan: Continue per plan of care.      Precautions: History reviewed. No pertinent past medical history. T7-T8 compression fracture   Date  2/6 2/8 2/13 2/15 2/20     Visit # 1 2 3 4 5 6 7 8     FOTO done      X      Re-eval       X            Manuals  2/ 2/8 2/13 2/15 2/20     P to A L/S                          STM  paraspinalis  FH L medial scap  FH L medial scap, L T-S paraspinals L medial scap, L T-S paraspinals NP      MFD    5 min          Neuro Re-Ed             TA hold 10''x10 10''x15 10''x15 NV 10\"x20 10''x15 NP      TA 90/90 hold with alt arms 5''x10  5''x20 5''x20 NV x20 x20 NV      TA hold with MB flex             Dead bug             Wall Ts, Ys  2x10 Prone today 2x10 Wall today 2x10 ea Prone 2x10 ea Prone 2x10 ea Prone 2x10 ea Prone 3 x10  Prone 3x10                  Quad donkey kicks             Quad Fire hydtrant             TA hold with March   2x10 NV 2x10, 5\" 2x10, 5\"  OH hold 5# b/L w/ march                                            Ther Ex             UBE or BIke  3'/3' 3'/3' 3'/3' 3'/3' 3'/3' 3'/3' " 3'/3'     Ball roll outs in sitting             Tband Ws  GTB  2x10 GTB 2x10 5# 2x10 Vignesh    GTB 3x10     LTR  10''x10 5-10''x10 Home NP        Bilateral ER/no monies  [id number]  GTB 2x10 supine ER Vignesh  5# b/l  GTB 2x10 standing ER [place]  5# b/l ER Vignesh  5# b/l 2x10     H abduction              Bridges  5''x20 5''x20 NV         SLR             Clamshells             Tband H,M,L rows  Mid BTB 2x10 Mid/low BTB 2x10 ea [place] 15# rows  10# ext 2x10 ea   Vignesh 10# rows  10# ext 2x10 ea  Vignesh 15# rows  10# ext 2x10 ea  Vignesh 15# rows  10# ext 2x10 ea      Pall off press    5# 1x10 ea  5# 2x10 ea Chest  press 8# NV     T/S AROM ext, Sb, rot       5''x10 5''x15     OH lift, Floor to chest       10#-15# 5# B/L 2x10   Chair RDL 2x10     Ther Activity                                       Gait Training                                       Modalities

## 2024-02-23 ENCOUNTER — OFFICE VISIT (OUTPATIENT)
Dept: PHYSICAL THERAPY | Facility: CLINIC | Age: 29
End: 2024-02-23
Payer: COMMERCIAL

## 2024-02-23 DIAGNOSIS — M54.6 ACUTE MIDLINE THORACIC BACK PAIN: Primary | ICD-10-CM

## 2024-02-23 PROCEDURE — 97110 THERAPEUTIC EXERCISES: CPT

## 2024-02-23 PROCEDURE — 97112 NEUROMUSCULAR REEDUCATION: CPT

## 2024-02-23 NOTE — PROGRESS NOTES
"Daily Note     Today's date: 2024  Patient name: Key Pinto  : 1995  MRN: [id number]  Referring provider: Ilana Martin*  Dx:   Encounter Diagnosis     ICD-10-CM    1. Acute midline thoracic back pain  M54.6           Start Time: 0900  Stop Time: 0940  Total time in clinic (min): 40 minutes    Subjective: Pt reports no soreness at the start of her session and notes she feels better than her previous session. Pt reports no medical updates.       Objective: See treatment diary below      Assessment:  Key tolerated treatment well with consistent cuing throughout. TE's were performed with increased reps and increased resistance. New TE's were demonstrated with proper technique, and tolerated well. Following treatment, the patient demonstrated fatigue post treatment, exhibited good technique with therapeutic exercises, and would benefit from continued PT.       Plan: Continue per plan of care.      Plan: Continue per plan of care.      Precautions: History reviewed. No pertinent past medical history. T7-T8 compression fracture   Date  2/ 2/8 2/13 2/15 2/20 2/23    Visit # 1 2 3 4 5 6 7 8 9    FOTO done      X      Re-eval       X            Manuals  2/6 2/8 2/13 2/15 2/20 2/23    P to A L/S                          STM  paraspinalis  FH L medial scap  FH L medial scap, L T-S paraspinals L medial scap, L T-S paraspinals NP      MFD    5 min          Neuro Re-Ed             TA hold 10''x10 10''x15 10''x15 NV 10\"x20 10''x15 NP      TA 90/90 hold with alt arms 5''x10  5''x20 5''x20 NV x20 x20 NV      TA hold with MB flex             Dead bug             Wall Ts, Ys  2x10 Prone today 2x10 Wall today 2x10 ea Prone 2x10 ea Prone 2x10 ea Prone 2x10 ea Prone 3 x10  Prone 3x10 Prone 2# 3x10 added Is                 Quad donkey kicks             Quad Fire hydtrant             TA hold with March   2x10 NV 2x10, 5\" 2x10, 5\"  OH hold 5# b/L w/ march OH hold 6# b/L w/ march  "   Rows         Fwd lean 6# 2x10    Push up         Wall  2x10                 Ther Ex             UBE or BIke  3'/3' 3'/3' 3'/3' 3'/3' 3'/3' 3'/3' 3'/3' 3'/3'    B chest press         BTB 3x10    Tband Ws  GTB  2x10 GTB 2x10 5# 2x10 Vignesh    GTB 3x10 GTB into OH lift 3x10    LTR  10''x10 5-10''x10 Home NP        Bilateral ER/no monies  [id number]  GTB 2x10 supine ER Vignesh  5# b/l  GTB 2x10 standing ER Vignesh  5# b/l ER [place]  5# b/l 2x10 BTB row to B/l ER 3x10    H abduction          GTB to OH 3x10    Bridges  5''x20 5''x20 NV         SLR             Clamshells             Tband H,M,L rows  Mid BTB 2x10 Mid/low BTB 2x10 ea Vignesh 15# rows  10# ext 2x10 ea   [place] 10# rows  10# ext 2x10 ea  Vignesh 15# rows  10# ext 2x10 ea  Vignesh 15# rows  10# ext 2x10 ea  Vignesh 15# rows  10# ext 2x10 ea     Pall off press    5# 1x10 ea  5# 2x10 ea Chest  press 8# NV OH press 6# B/L    T/S AROM ext, Sb, rot       5''x10 5''x15 5''x15    OH lift, Floor to chest       10#-15# 5# B/L 2x10   Chair RDL 2x10 6# B/L 2x10    RDL 2x10    Ther Activity                                       Gait Training                                       Modalities

## 2024-02-28 ENCOUNTER — OFFICE VISIT (OUTPATIENT)
Dept: PHYSICAL THERAPY | Facility: CLINIC | Age: 29
End: 2024-02-28
Payer: COMMERCIAL

## 2024-02-28 DIAGNOSIS — M54.6 ACUTE MIDLINE THORACIC BACK PAIN: Primary | ICD-10-CM

## 2024-02-28 PROCEDURE — 97110 THERAPEUTIC EXERCISES: CPT

## 2024-02-28 PROCEDURE — 97112 NEUROMUSCULAR REEDUCATION: CPT

## 2024-02-28 NOTE — PROGRESS NOTES
"Daily Note     Today's date: 2024  Patient name: Key Pinto  : 1995  MRN: [id number]  Referring provider: Ilana Martin*  Dx:   Encounter Diagnosis     ICD-10-CM    1. Acute midline thoracic back pain  M54.6           Start Time: 1130  Stop Time: 1215  Total time in clinic (min): 45 minutes    Subjective: Pt reports still having the same pain to T7-T8 region that sometimes radiates to shoulders. Overall feels she is improving with bending down movements.       Objective: See treatment diary below      Assessment:  Key tolerated treatment well with focus on scapular strengthening and ROM to address symptoms. Patient demonstrates good form and technique during TE's. Patient able to complete program without pain or discomfort. Patient is fatigued post treatment and would benefit from further PT to address limitations and restore to prior level of function.          Plan: Continue per plan of care.      Plan: Continue per plan of care.      Precautions: History reviewed. No pertinent past medical history. T7-T8 compression fracture   Date 1/25 1/30 2/1 2/6 2/8 2/13 2/15 2/20 2/23 2/28   Visit # 1 2 3 4 5 6 7 8 9 10   FOTO done      X      Re-eval       X            Manuals 2/1 2/6 2/8 2/13 2/15 2/20 2/23 2/28   P to A L/S                      STM  paraspinalis  FH L medial scap, L T-S paraspinals L medial scap, L T-S paraspinals NP      MFD  5 min          Neuro Re-Ed           TA hold 10''x15 NV 10\"x20 10''x15 NP      TA 90/90 hold with alt arms 5''x20 NV x20 x20 NV      TA hold with MB flex           Dead bug           Wall Ts, Ys  Wall today 2x10 ea Prone 2x10 ea Prone 2x10 ea Prone 2x10 ea Prone 3 x10  Prone 3x10 Prone 2# 3x10 added Is NT              Quad donkey kicks           Quad Fire hydtrant           TA hold with March 2x10 NV 2x10, 5\" 2x10, 5\"  OH hold 5# b/L w/ march OH hold 6# b/L w/ march OH hold 6# b/L w/ march   Rows       Fwd lean 6# 2x10 Fwd lean 6# 2x10   Push up    " "   Wall  2x10 Wall  2x10              Ther Ex           UBE or BIke 3'/3' 3'/3' 3'/3' 3'/3' 3'/3' 3'/3' 3'/3' 3'/3'   B chest press       BTB 3x10 BTB 3x10   Tband Ws GTB 2x10 5# 2x10 [place]    GTB 3x10 GTB into OH lift 3x10 GTB into OH lift 3x10   LTR 5-10''x10 Home NP        Bilateral ER/no monies GTB 2x10 supine ER [place]  5# b/l  GTB 2x10 standing ER [place]  5# b/l ER Vignesh  5# b/l 2x10 BTB row to B/l ER 3x10 BTB row to B/l ER 3x10   H abduction        GTB to OH 3x10 GTB to OH 3x10   Bridges 5''x20 NV         SLR           Clamshells           Tband H,M,L rows Mid/low BTB 2x10 ea Vignesh 15# rows  10# ext 2x10 ea   [place] 10# rows  10# ext 2x10 ea  [place] 15# rows  10# ext 2x10 ea  Vignesh 15# rows  10# ext 2x10 ea  [place] 15# rows  10# ext 2x10 ea  [place] 15# rows  10# ext 2x10 ea    Pall off press  5# 1x10 ea  5# 2x10 ea Chest  press 8# NV OH press 6# B/L OH press 6# B/L   T/S AROM ext, Sb, rot     5''x10 5''x15 5''x15 5\"x15   OH lift, Floor to chest     10#-15# 5# B/L 2x10   Chair RDL 2x10 6# B/L 2x10    RDL 2x10 6# B/L 2x10    RDL 2x10   Ther Activity                                 Gait Training                                 Modalities                                             "

## 2024-03-01 ENCOUNTER — OFFICE VISIT (OUTPATIENT)
Dept: PHYSICAL THERAPY | Facility: CLINIC | Age: 29
End: 2024-03-01
Payer: COMMERCIAL

## 2024-03-01 DIAGNOSIS — M54.6 ACUTE MIDLINE THORACIC BACK PAIN: Primary | ICD-10-CM

## 2024-03-01 PROCEDURE — 97110 THERAPEUTIC EXERCISES: CPT

## 2024-03-01 PROCEDURE — 97112 NEUROMUSCULAR REEDUCATION: CPT

## 2024-03-01 NOTE — PROGRESS NOTES
Daily Note     Today's date: 3/1/2024  Patient name: Key Pinto  : 1995  MRN: [id number]  Referring provider: Ilana Martin*  Dx:   Encounter Diagnosis     ICD-10-CM    1. Acute midline thoracic back pain  M54.6           Start Time: 1200  Stop Time: 1245  Total time in clinic (min): 45 minutes    Subjective: Pt reports pain along mid T/S continues to persist.     Objective: See treatment diary below      Assessment: Tolerated treatment well. HEP was printed and reviewed this session. Patient demonstrated fatigue post treatment, exhibited good technique with therapeutic exercises, and would benefit from continued PT      Plan: Continue per plan of care.      Plan: Continue per plan of care.      Precautions: History reviewed. No pertinent past medical history. T7-T8 compression fracture   Date 2/15 2/20 2/23 2/28 3/1   Visit # 7 8 9 10 11   FOTO X       Re-eval X             Manuals 2/15 2/20 2/23 2/28 3/1   P to A L/S     Right Rib 3-5 grade 3-4             STM  paraspinalis NP       MFD     FH 5 min   Neuro Re-Ed        TA hold NP       TA 90/90 hold with alt arms NV       TA hold with MB flex        Dead bug        Wall Ts, Ys  Prone 3 x10  Prone 3x10 Prone 2# 3x10 added Is NT Prone 3x10           Quad donkey kicks        Quad Fire hydtrant        TA hold with March  OH hold 5# b/L w/ march OH hold 6# b/L w/ march OH hold 6# b/L w/ march NV   Rows   Fwd lean 6# 2x10 Fwd lean 6# 2x10    Push up   Wall  2x10 Wall  2x10  NV           Ther Ex        UBE or BIke 3'/3' 3'/3' 3'/3' 3'/3' 3'/3'   B chest press   BTB 3x10 BTB 3x10    Tband Ws  GTB 3x10 GTB into OH lift 3x10 GTB into OH lift 3x10    LTR        Bilateral ER/no monies ER Vignesh  5# b/l ER [place]  5# b/l 2x10 BTB row to B/l ER 3x10 BTB row to B/l ER 3x10 BTB row to B/l ER 3x10   H abduction    GTB to OH 3x10 GTB to OH 3x10 NV   Bridges        SLR        Clamshells        Tband H,M,L rows Vignesh 15# rows  10# ext 2x10 ea  [place] 15#  "rows  10# ext 2x10 ea  [place] 15# rows  10# ext 2x10 ea  Vignesh 15# rows  10# ext 2x10 ea  NV   Pall off press Chest  press 8# NV OH press 6# B/L OH press 6# B/L NV   T/S AROM ext, Sb, rot 5''x10 5''x15 5''x15 5\"x15 5\"x15   OH lift, Floor to chest 10#-15# 5# B/L 2x10   Chair RDL 2x10 6# B/L 2x10    RDL 2x10 6# B/L 2x10    RDL 2x10 NV   Ther Activity                        Gait Training                        Modalities                                      "

## 2024-03-04 ENCOUNTER — OFFICE VISIT (OUTPATIENT)
Dept: PHYSICAL THERAPY | Facility: CLINIC | Age: 29
End: 2024-03-04
Payer: COMMERCIAL

## 2024-03-04 DIAGNOSIS — M54.6 ACUTE MIDLINE THORACIC BACK PAIN: Primary | ICD-10-CM

## 2024-03-04 PROCEDURE — 97112 NEUROMUSCULAR REEDUCATION: CPT

## 2024-03-04 PROCEDURE — 97110 THERAPEUTIC EXERCISES: CPT

## 2024-03-04 PROCEDURE — 97140 MANUAL THERAPY 1/> REGIONS: CPT

## 2024-03-04 NOTE — PROGRESS NOTES
Daily Note     Today's date: 3/4/2024  Patient name: Key Pinto  : 1995  MRN: [id number]  Referring provider: Ilana Martin*  Dx:   Encounter Diagnosis     ICD-10-CM    1. Acute midline thoracic back pain  M54.6                      Subjective: Pt reports feeling sore today.      Objective: See treatment diary below      Assessment: Tolerated treatment well. Initiated thoracic extension exercises with use of towel roll with good tolerance.  May try 1/2 foam roll next visit.  Patient limited in mobility with right trunk rotation with open book stretch.  Patient demonstrated fatigue post treatment, exhibited good technique with therapeutic exercises, and would benefit from continued PT      Plan: Continue per plan of care.      Plan: Continue per plan of care.      Precautions: History reviewed. No pertinent past medical history. T7-T8 compression fracture   Date 2/15 2/20 2/23 2/28 3/1 3/4   Visit # 7 8 9 10 11 12   FOTO X        Re-eval X              Manuals 2/15 2/20 2/23 2/28 3/1 3/4   P to A L/S     Right Rib 3-5 grade 3-4   Right Rib 3-5 grade 3-4            STM  paraspinalis NP        MFD     FH 5 min    Neuro Re-Ed         TA hold NP        TA 90/90 hold with alt arms NV        TA hold with MB flex         Dead bug         Wall Ts, Ys  Prone 3 x10  Prone 3x10 Prone 2# 3x10 added Is NT Prone 3x10 Prone 3x10            Quad donkey kicks         Quad Fire hydtrant         TA hold with March  OH hold 5# b/L w/ march OH hold 6# b/L w/ march OH hold 6# b/L w/ march NV OH hold 6# b/l w/ march  2x10   Rows   Fwd lean 6# 2x10 Fwd lean 6# 2x10     Push up   Wall  2x10 Wall  2x10  NV Table push ups 2x10            Ther Ex         UBE or BIke 3'/3' 3'/3' 3'/3' 3'/3' 3'/3' 3'/3'   B chest press   BTB 3x10 BTB 3x10     Tband Ws  GTB 3x10 GTB into OH lift 3x10 GTB into OH lift 3x10     LTR         Bilateral ER/no monies ER Vignesh  5# b/l ER [place]  5# b/l 2x10 BTB row to B/l ER 3x10 BTB row to B/l  "ER 3x10 BTB row to B/l ER 3x10 BTB row to B/L ER 3x10   H abduction    GTB to OH 3x10 GTB to OH 3x10 NV GTB to OH 3x10   Supine towel roll at T4-T5 w/overhead flexion and arm circles       X10 ea                     Open book stretch      X10 ea   Bridges      X10 w/overhead flexion   SLR         Clamshells         Tband H,M,L rows Vignesh 15# rows  10# ext 2x10 ea  [place] 15# rows  10# ext 2x10 ea  Vignesh 15# rows  10# ext 2x10 ea  Vignesh 15# rows  10# ext 2x10 ea  NV [place] 15# rows  10# ext 3x10 ea    Pall off press Chest  press 8# NV OH press 6# B/L OH press 6# B/L NV OH press 6# B/L   T/S AROM ext, Sb, rot 5''x10 5''x15 5''x15 5\"x15 5\"x15    OH lift, Floor to chest 10#-15# 5# B/L 2x10   Chair RDL 2x10 6# B/L 2x10    RDL 2x10 6# B/L 2x10    RDL 2x10 NV 6# B/L 2x10    RDL 2x10   Ther Activity                           Gait Training                           Modalities                                         "

## 2024-03-06 ENCOUNTER — OFFICE VISIT (OUTPATIENT)
Dept: PHYSICAL THERAPY | Facility: CLINIC | Age: 29
End: 2024-03-06
Payer: COMMERCIAL

## 2024-03-06 DIAGNOSIS — M54.6 ACUTE MIDLINE THORACIC BACK PAIN: Primary | ICD-10-CM

## 2024-03-06 PROCEDURE — 97110 THERAPEUTIC EXERCISES: CPT

## 2024-03-06 PROCEDURE — 97112 NEUROMUSCULAR REEDUCATION: CPT

## 2024-03-06 PROCEDURE — 97140 MANUAL THERAPY 1/> REGIONS: CPT

## 2024-03-06 NOTE — PROGRESS NOTES
Daily Note     Today's date: 3/6/2024  Patient name: Key Pinto  : 1995  MRN: [id number]  Referring provider: Ilana Martin*  Dx:   Encounter Diagnosis     ICD-10-CM    1. Acute midline thoracic back pain  M54.6           Start Time: 0900  Stop Time: 0945  Total time in clinic (min): 45 minutes    Subjective: Pt continues to note right sided medial scapular discomfort, which she notes makes it difficult to twist and take deep breaths. She additionally reports some difficulty with constipation in the past 2-3 weeks which she will be following up with MD today about.       Objective: See treatment diary below      Assessment: Tolerated treatment well. Manuals were completed to assist with mobility of the right scapula, and cupping was completed to assist with pain modulation. Patient demonstrated fatigue post treatment, exhibited good technique with therapeutic exercises, and would benefit from continued PT      Plan: Continue per plan of care.      Plan: Continue per plan of care.      Precautions: History reviewed. No pertinent past medical history. T7-T8 compression fracture   Date 2/15 2/20 2/23 2/28 3/1 3/4 3   Visit # 7 8 9 10 11 12 13   FOTO X         Re-eval X               Manuals 2/15 2/20 2/23 2/28 3/1 3/ 3   P to A L/S     Right Rib 3-5 grade 3-4   Right Rib 3-5 grade 3-4 Right Rib 3-5 grade 3-4             STM  paraspinalis NP         MFD     FH 5 min  FH 5 min   Neuro Re-Ed          TA hold NP         TA 90/90 hold with alt arms NV         TA hold with MB flex          Dead bug          Wall Ts, Ys  Prone 3 x10  Prone 3x10 Prone 2# 3x10 added Is NT Prone 3x10 Prone 3x10    Prone press ups       5''x10   Quad donkey kicks          Quad Fire hydtrant          TA hold with March  OH hold 5# b/L w/ march OH hold 6# b/L w/ march OH hold 6# b/L w/ march NV OH hold 6# b/l w/ march  2x10    Rows   Fwd lean 6# 2x10 Fwd lean 6# 2x10      Push up   Wall  2x10 Wall  2x10  NV Table push  "ups 2x10    Quad T rot       10''x10   Cat camel       10''x10   Ther Ex          UBE or BIke 3'/3' 3'/3' 3'/3' 3'/3' 3'/3' 3'/3' 3'/3'   B chest press   BTB 3x10 BTB 3x10      Tband Ws  GTB 3x10 GTB into OH lift 3x10 GTB into OH lift 3x10      LTR          Bilateral ER/no monies ER Vignesh  5# b/l ER Vignesh  5# b/l 2x10 BTB row to B/l ER 3x10 BTB row to B/l ER 3x10 BTB row to B/l ER 3x10 BTB row to B/L ER 3x10    H abduction    GTB to OH 3x10 GTB to OH 3x10 NV GTB to OH 3x10    Supine towel roll at T4-T5 w/overhead flexion and arm circles       X10 ea                        Open book stretch      X10 ea 10''x10   Bridges      X10 w/overhead flexion    SLR          Clamshells          Tband H,M,L rows [place] 15# rows  10# ext 2x10 ea  [place] 15# rows  10# ext 2x10 ea  [place] 15# rows  10# ext 2x10 ea  Vignesh 15# rows  10# ext 2x10 ea  NV Vignesh 15# rows  10# ext 3x10 ea     Pall off press Chest  press 8# NV OH press 6# B/L OH press 6# B/L NV OH press 6# B/L    T/S AROM ext, Sb, rot 5''x10 5''x15 5''x15 5\"x15 5\"x15     OH lift, Floor to chest 10#-15# 5# B/L 2x10   Chair RDL 2x10 6# B/L 2x10    RDL 2x10 6# B/L 2x10    RDL 2x10 NV 6# B/L 2x10    RDL 2x10    Ther Activity                              Gait Training                              Modalities                                              "

## 2024-03-14 ENCOUNTER — OFFICE VISIT (OUTPATIENT)
Dept: PHYSICAL THERAPY | Facility: CLINIC | Age: 29
End: 2024-03-14
Payer: COMMERCIAL

## 2024-03-14 DIAGNOSIS — M54.6 ACUTE MIDLINE THORACIC BACK PAIN: Primary | ICD-10-CM

## 2024-03-14 PROCEDURE — 97112 NEUROMUSCULAR REEDUCATION: CPT

## 2024-03-14 PROCEDURE — 97110 THERAPEUTIC EXERCISES: CPT

## 2024-03-14 NOTE — PROGRESS NOTES
PT Re-Evaluation     Today's date: 3/14/2024  Patient name: Key Pinto  : 1995  MRN: [id number]  Referring provider: Ilana Martin*  Dx:   Encounter Diagnosis     ICD-10-CM    1. Acute midline thoracic back pain  M54.6           Start Time: 1214  Stop Time: 1300  Total time in clinic (min): 46 minutesAssessment  Assessment details: Key is a 27 yo female presenting to OP PT secondary to for RE hx of T/S compression fracture with onset 24, and current thoracic discomfort. Pt demonstrates near normal strength in B/L UE strength and normal ROM with T/S AROM/PROM for all ranges excep Left sidebneding.  Pt reports tolerance to work duties has improved and abiltiy to lift up to 20# increased, however, due to continues pain along medial scapula.   Pt will continue therapy until the end of the month and return to work. Exercises were updated focusing on pain modulation, strengthening, and improving lifting mechanics to improve return to work. Pt will be Dcd to HEP in 2 weeks.   Impairments: abnormal or restricted ROM, activity intolerance, impaired physical strength and pain with function    FUNCTIONAL TESTING    Pushing/pulling  20#, B/L 2x10 reps  Standing 3-4 hrs  Repeated forward bending 20 reps  Sustained forward bending NA   Lifting from Ground to waist 20#, 20 reps   Lifting from Ground to shoulder 20#, 10-20 reps (pain with 15# after 5 reps)  Overhead lifting 10-15#, 10-20 reps       Symptom irritability: moderate  Understanding of Dx/Px/POC: excellent  Goals  Pt will demonstrate [place] with progressive home exercise program to improve carryover and decrease recurrence of symptoms.  Pt will demonstrate 20% improvement in FOTO score to increase tolerance to functional activities   Pt will demonstrate 10-20 deg improvement in lumbar AROM to increase tolerance to reaching for items off the floor and improve ease with turning MET all WNL  Pt will demonstrate tolerance to lifting  medium weights (up to 15Ibs) with min to no discomfort to improve ease with carrying groceries MET  Pt will demonstrate 5/5 in LE strength to allow for improved tolerance to prolonged amb/standing MET  Pt will demonstrate [place] with progressive home exercise program to assist with PT carry over and prevent recurrence of symptoms in the future   Pt will demonstrate 4+/5 in UE strength to allow for improved tolerance to lifting items overhead. MET  Pt will demonstrate 4+/5 in periscap strength to improve posture in sitting. (Progress)      Plan  Patient would benefit from: PT eval and skilled physical therapy  Planned modality interventions: TENS, cryotherapy, manual electrical stimulation, low level laser therapy and thermotherapy: hydrocollator packs  Planned therapy interventions: IASTM, joint mobilization, massage, manual therapy, neuromuscular re-education, orthotic fitting/training, patient education, strengthening, stretching, therapeutic activities, therapeutic exercise and flexibility  Frequency: 1-2x week  Duration in weeks: 4  Plan of Care beginning date: 3/14/2024  Plan of Care expiration date: 4/1/2024  Treatment plan discussed with: patient        Subjective Evaluation  RE: 3/14/24  Pt had returned to work this past Friday and was working the past 2-3 days. She notes back pain has been significantly better but now notices come discomfort/tightness along medial R scapula. She said she was able to tolerate activity at work fairly well but had some difficulty due to scapular pain.     RE 2/15/24  Key has been in therapy for a total of 7 visits beginning 1/25/24 to 2/15/24 for treatment of thoracic back pain. She reports overall improvement in tolerance to daily activities such as vacuuming, putting away dishes, and reaching overhead with less discomfort. She continues to not discomfort with repeated or sustained flexion, and lifting >15#.     History of Present Illness  Date of onset:  2024  Mechanism of injury: trauma  Mechanism of injury: Key presents to OP PT secondary to slip down stairs hitting her back twice on stairs on 24. X-ray revealed vertebral compression fracture T7-T8. Pt is currently under restrictions No lifting >25Ibs, no pushing, no pulling, no excessive twisting. Pt notes discomfort when forward bending, lifting dishes, and with prolonged standing. She additionally reports difficulty with holding items overhead and turning/twisting.  Pt is a  and is required to lift >25Ibs, currently she is out of work. 25 pt is given potential clearance to return but will obtain follow up X-ray. Pt trials heat/ice for discomfort and intermittently will take OTC medication. Pt has history of chronic low back unrelated to fall, right sided of LB. Denies any numbness/tingling or referral sx.  Quality of life: good    Patient Goals  Patient goals for therapy: increased strength, independence with ADLs/IADLs, decreased pain and return to work  Patient goal: Return  Pain  Current pain ratin  At best pain rating: 3  At worst pain ratin  Quality: pressure, pulling and burning  Relieving factors: ice  Aggravating factors: sitting, standing, walking and lifting        Objective     Palpation     Right   Tenderness of the middle trapezius and thoracic paraspinals.     Active Range of Motion   Cervical/Thoracic Spine       Thoracic  Flexion WNL  Extension mod  Left lateral flexion:  with pain Restriction level: min  Right lateral flexion:  WNL  Left rotation:  WNL  Right rotation:  WNL  Normal active range of motion    Right Shoulder   Normal active range of motion    Lumbar   Flexion:  Restriction level: WNL  Extension:  Restriction level: WNL  Left lateral flexion:  Restriction level: minimal  Right lateral flexion:  Restriction level: WNL  Left rotation:  Restriction level: WNL   Right rotation:  Restriction level: WNL    Strength/Myotome Testing     Left  Shoulder     Planes of Motion   Flexion: 5  Extension: 5   Abduction: 5  External rotation at 90°: 4 +  External rotation BTH: 4 +  Internal rotation at 90°: 4 +  Internal rotation BTB: 4 +    Isolated Muscles   Biceps: 5  Lower trapezius: 4+  Middle trapezius: 4+  Rhomboids: 4  Triceps:5    Right Shoulder     Planes of Motion   Flexion: 5  Extension: 5  Abduction: 5  External rotation at 90°: 4 +  External rotation BTH: 4 +  Internal rotation at 90°: 4+   Internal rotation BTB: 4+    Isolated Muscles   Biceps: 5  Lower trapezius: 4+  Middle trapezius: 4+  Rhomboids: 4  Triceps: 5    Flowsheet Rows      Flowsheet Row Most Recent Value   PT/OT G-Codes    Current Score 58   Projected Score 78        JOB REQUIREMENTS:  Standing 8-10 hours   Repeated unassisted lifting of 20Ibs overhead   Pushing/pulling 20Ibs    FUNCTIONAL TESTING 3/14/24 RE:    Pushing/pulling  20#, B/L 2x10 reps  Standing 3-4 hrs  Repeated forward bending 20 reps  Sustained forward bending NA   Lifting from Ground to waist 20IB, 20 reps   Lifting from Ground to shoulder 20IBs, 10-20 reps (pain with 15# after 5 reps)  Overhead lifting 10-15#, 10-20 reps               Precautions: History reviewed. No pertinent past medical history. T7-T8 compression fracture   Date 2/15 2/20 2/23 2/28 3/1 3/4 3/6 3/14   Visit # 7 8 9 10 11 12 13    FOTO X          Re-eval X                Manuals 2/15 2/20 2/23 2/28 3/1 3/4 3/6 3/14   P to A L/S     Right Rib 3-5 grade 3-4   Right Rib 3-5 grade 3-4 Right Rib 3-5 grade 3-4 Right Scap grade 5 x1, rib mobilization gr 3-4              STM  paraspinalis NP          MFD     FH 5 min  FH 5 min    Neuro Re-Ed           TA hold NP          TA 90/90 hold with alt arms NV          TA hold with MB flex           Dead bug           Wall Ts, Ys  Prone 3 x10  Prone 3x10 Prone 2# 3x10 added Is NT Prone 3x10 Prone 3x10  YS and Ws 3x10   Prone press ups       5''x10    Quad donkey kicks           Quad Fire hydtrant           TA hold  "with March  OH hold 5# b/L w/ march OH hold 6# b/L w/ march OH hold 6# b/L w/ march NV OH hold 6# b/l w/ march  2x10  NV   Rows   Fwd lean 6# 2x10 Fwd lean 6# 2x10       Push up   Wall  2x10 Wall  2x10  NV Table push ups 2x10     Quad T rot       10''x10 10x10'', thread the needle 10x10''   Cat camel       10''x10    Ther Ex           UBE or BIke 3'/3' 3'/3' 3'/3' 3'/3' 3'/3' 3'/3' 3'/3' 3'x3'   B chest press   BTB 3x10 BTB 3x10       Tband Ws  GTB 3x10 GTB into OH lift 3x10 GTB into OH lift 3x10       LTR           Bilateral ER/no monies ER Vignesh  5# b/l ER Vignesh  5# b/l 2x10 BTB row to B/l ER 3x10 BTB row to B/l ER 3x10 BTB row to B/l ER 3x10 BTB row to B/L ER 3x10  BTB row to B/L ER 3x10   H abduction    GTB to OH 3x10 GTB to OH 3x10 NV GTB to OH 3x10     Supine towel roll at T4-T5 w/overhead flexion and arm circles       X10 ea  Seated 2x10 reaching                         Open book stretch      X10 ea 10''x10    Bridges      X10 w/overhead flexion     SLR           Clamshells           Tband H,M,L rows Vignesh 15# rows  10# ext 2x10 ea  Vignesh 15# rows  10# ext 2x10 ea  Vignesh 15# rows  10# ext 2x10 ea  [place] 15# rows  10# ext 2x10 ea  NV [place] 15# rows  10# ext 3x10 ea   NV increase to 20Ibs as tolerated for rows   Pall off press Chest  press 8# NV OH press 6# B/L OH press 6# B/L NV OH press 6# B/L  OH press 10Ibs   T/S AROM ext, Sb, rot 5''x10 5''x15 5''x15 5\"x15 5\"x15      OH lift, Floor to chest 10#-15# 5# B/L 2x10   Chair RDL 2x10 6# B/L 2x10    RDL 2x10 6# B/L 2x10    RDL 2x10 NV 6# B/L 2x10    RDL 2x10  NV increase to 10-15Ibs   Ther Activity                                 Gait Training                                 Modalities                                                   "

## 2024-03-14 NOTE — LETTER
2024      No Recipients    Patient: Key Pinto   YOB: 1995   Date of Visit: 3/14/2024     Encounter Diagnosis     ICD-10-CM    1. Acute midline thoracic back pain  M54.6           Dear Dr. Martin:    Thank you for your recent referral of Key Pinto. Please review the attached evaluation summary from Key's recent visit.     Please verify that you agree with the plan of care by signing the attached order.     If you have any questions or concerns, please do not hesitate to call.     I sincerely appreciate the opportunity to share in the care of one of your patients and hope to have another opportunity to work with you in the near future.       Sincerely,    Janna Lopze, PT      Referring Provider:      I certify that I have read the below Plan of Care and certify the need for these services furnished under this plan of treatment while under my care.                    CHESTER Fletcher  [address] [address]  Via Fax: [phone]          PT Re-Evaluation     Today's date: 3/14/2024  Patient name: Key Pinto  : 1995  MRN: [id number]  Referring provider: Ilana Martin*  Dx:   Encounter Diagnosis     ICD-10-CM    1. Acute midline thoracic back pain  M54.6           Start Time: 1214  Stop Time: 1300  Total time in clinic (min): 46 minutesAssessment  Assessment details: Key is a 27 yo female presenting to OP PT secondary to for RE hx of T/S compression fracture with onset 24, and current thoracic discomfort. Pt demonstrates near normal strength in B/L UE strength and normal ROM with T/S AROM/PROM for all ranges excep Left sidebneding.  Pt reports tolerance to work duties has improved and abiltiy to lift up to 20# increased, however, due to continues pain along medial scapula.   Pt will continue therapy until the end of the month and return to work. Exercises were updated focusing on pain modulation, strengthening,  and improving lifting mechanics to improve return to work. Pt will be Dcd to HEP in 2 weeks.   Impairments: abnormal or restricted ROM, activity intolerance, impaired physical strength and pain with function    FUNCTIONAL TESTING    Pushing/pulling  20#, B/L 2x10 reps  Standing 3-4 hrs  Repeated forward bending 20 reps  Sustained forward bending NA   Lifting from Ground to waist 20#, 20 reps   Lifting from Ground to shoulder 20#, 10-20 reps (pain with 15# after 5 reps)  Overhead lifting 10-15#, 10-20 reps       Symptom irritability: moderate  Understanding of Dx/Px/POC: excellent  Goals  Pt will demonstrate [place] with progressive home exercise program to improve carryover and decrease recurrence of symptoms.  Pt will demonstrate 20% improvement in FOTO score to increase tolerance to functional activities   Pt will demonstrate 10-20 deg improvement in lumbar AROM to increase tolerance to reaching for items off the floor and improve ease with turning MET all WNL  Pt will demonstrate tolerance to lifting medium weights (up to 15Ibs) with min to no discomfort to improve ease with carrying groceries MET  Pt will demonstrate 5/5 in LE strength to allow for improved tolerance to prolonged amb/standing MET  Pt will demonstrate [place] with progressive home exercise program to assist with PT carry over and prevent recurrence of symptoms in the future   Pt will demonstrate 4+/5 in UE strength to allow for improved tolerance to lifting items overhead. MET  Pt will demonstrate 4+/5 in periscap strength to improve posture in sitting. (Progress)      Plan  Patient would benefit from: PT eval and skilled physical therapy  Planned modality interventions: TENS, cryotherapy, manual electrical stimulation, low level laser therapy and thermotherapy: hydrocollator packs  Planned therapy interventions: IASTM, joint mobilization, massage, manual therapy, neuromuscular re-education, orthotic fitting/training, patient education,  strengthening, stretching, therapeutic activities, therapeutic exercise and flexibility  Frequency: 1-2x week  Duration in weeks: 4  Plan of Care beginning date: 3/14/2024  Plan of Care expiration date: 4/1/2024  Treatment plan discussed with: patient        Subjective Evaluation  RE: 3/14/24  Pt had returned to work this past Friday and was working the past 2-3 days. She notes back pain has been significantly better but now notices come discomfort/tightness along medial R scapula. She said she was able to tolerate activity at work fairly well but had some difficulty due to scapular pain.     RE 2/15/24  Key has been in therapy for a total of 7 visits beginning 1/25/24 to 2/15/24 for treatment of thoracic back pain. She reports overall improvement in tolerance to daily activities such as vacuuming, putting away dishes, and reaching overhead with less discomfort. She continues to not discomfort with repeated or sustained flexion, and lifting >15#.     History of Present Illness  Date of onset: 1/8/2024  Mechanism of injury: trauma  Mechanism of injury: Key presents to OP PT secondary to slip down stairs hitting her back twice on stairs on 1/08/24. X-ray revealed vertebral compression fracture T7-T8. Pt is currently under restrictions No lifting >25Ibs, no pushing, no pulling, no excessive twisting. Pt notes discomfort when forward bending, lifting dishes, and with prolonged standing. She additionally reports difficulty with holding items overhead and turning/twisting.  Pt is a  and is required to lift >25Ibs, currently she is out of work. 02/09/25 pt is given potential clearance to return but will obtain follow up X-ray. Pt trials heat/ice for discomfort and intermittently will take OTC medication. Pt has history of chronic low back unrelated to fall, right sided of LB. Denies any numbness/tingling or referral sx.  Quality of life: good    Patient Goals  Patient goals for therapy: increased  strength, independence with ADLs/IADLs, decreased pain and return to work  Patient goal: Return  Pain  Current pain ratin  At best pain rating: 3  At worst pain ratin  Quality: pressure, pulling and burning  Relieving factors: ice  Aggravating factors: sitting, standing, walking and lifting        Objective     Palpation     Right   Tenderness of the middle trapezius and thoracic paraspinals.     Active Range of Motion   Cervical/Thoracic Spine       Thoracic  Flexion WNL  Extension mod  Left lateral flexion:  with pain Restriction level: min  Right lateral flexion:  WNL  Left rotation:  WNL  Right rotation:  WNL  Normal active range of motion    Right Shoulder   Normal active range of motion    Lumbar   Flexion:  Restriction level: WNL  Extension:  Restriction level: WNL  Left lateral flexion:  Restriction level: minimal  Right lateral flexion:  Restriction level: WNL  Left rotation:  Restriction level: WNL   Right rotation:  Restriction level: WNL    Strength/Myotome Testing     Left Shoulder     Planes of Motion   Flexion: 5  Extension: 5   Abduction: 5  External rotation at 90°: 4 +  External rotation BTH: 4 +  Internal rotation at 90°: 4 +  Internal rotation BTB: 4 +    Isolated Muscles   Biceps: 5  Lower trapezius: 4+  Middle trapezius: 4+  Rhomboids: 4  Triceps:5    Right Shoulder     Planes of Motion   Flexion: 5  Extension: 5  Abduction: 5  External rotation at 90°: 4 +  External rotation BTH: 4 +  Internal rotation at 90°: 4+   Internal rotation BTB: 4+    Isolated Muscles   Biceps: 5  Lower trapezius: 4+  Middle trapezius: 4+  Rhomboids: 4  Triceps: 5    Flowsheet Rows      Flowsheet Row Most Recent Value   PT/OT G-Codes    Current Score 58   Projected Score 78        JOB REQUIREMENTS:  Standing 8-10 hours   Repeated unassisted lifting of 20Ibs overhead   Pushing/pulling 20Ibs    FUNCTIONAL TESTING 3/14/24 RE:    Pushing/pulling  20#, B/L 2x10 reps  Standing 3-4 hrs  Repeated forward bending 20  reps  Sustained forward bending NA   Lifting from Ground to waist 20IB, 20 reps   Lifting from Ground to shoulder 20IBs, 10-20 reps (pain with 15# after 5 reps)  Overhead lifting 10-15#, 10-20 reps               Precautions: History reviewed. No pertinent past medical history. T7-T8 compression fracture   Date 2/15 2/20 2/23 2/28 3/1 3/4 3/6 3/14   Visit # 7 8 9 10 11 12 13    FOTO X          Re-eval X                Manuals 2/15 2/20 2/23 2/28 3/1 3/4 3/6 3/14   P to A L/S     Right Rib 3-5 grade 3-4   Right Rib 3-5 grade 3-4 Right Rib 3-5 grade 3-4 Right Scap grade 5 x1, rib mobilization gr 3-4              STM  paraspinalis NP          MFD     FH 5 min  FH 5 min    Neuro Re-Ed           TA hold NP          TA 90/90 hold with alt arms NV          TA hold with MB flex           Dead bug           Wall Ts, Ys  Prone 3 x10  Prone 3x10 Prone 2# 3x10 added Is NT Prone 3x10 Prone 3x10  YS and Ws 3x10   Prone press ups       5''x10    Quad donkey kicks           Quad Fire hydtrant           TA hold with March  OH hold 5# b/L w/ march OH hold 6# b/L w/ march OH hold 6# b/L w/ march NV OH hold 6# b/l w/ march  2x10  NV   Rows   Fwd lean 6# 2x10 Fwd lean 6# 2x10       Push up   Wall  2x10 Wall  2x10  NV Table push ups 2x10     Quad T rot       10''x10 10x10'', thread the needle 10x10''   Cat camel       10''x10    Ther Ex           UBE or BIke 3'/3' 3'/3' 3'/3' 3'/3' 3'/3' 3'/3' 3'/3' 3'x3'   B chest press   BTB 3x10 BTB 3x10       Tband Ws  GTB 3x10 GTB into OH lift 3x10 GTB into OH lift 3x10       LTR           Bilateral ER/no monies ER [place]  5# b/l ER Vignesh  5# b/l 2x10 BTB row to B/l ER 3x10 BTB row to B/l ER 3x10 BTB row to B/l ER 3x10 BTB row to B/L ER 3x10  BTB row to B/L ER 3x10   H abduction    GTB to OH 3x10 GTB to OH 3x10 NV GTB to OH 3x10     Supine towel roll at T4-T5 w/overhead flexion and arm circles       X10 ea  Seated 2x10 reaching                         Open book stretch      X10 ea 10''x10    Bridges  "     X10 w/overhead flexion     SLR           Clamshells           Tband H,M,L rows Vignesh 15# rows  10# ext 2x10 ea  Vignesh 15# rows  10# ext 2x10 ea  [place] 15# rows  10# ext 2x10 ea  Vignesh 15# rows  10# ext 2x10 ea  NV Vignesh 15# rows  10# ext 3x10 ea   NV increase to 20Ibs as tolerated for rows   Pall off press Chest  press 8# NV OH press 6# B/L OH press 6# B/L NV OH press 6# B/L  OH press 10Ibs   T/S AROM ext, Sb, rot 5''x10 5''x15 5''x15 5\"x15 5\"x15      OH lift, Floor to chest 10#-15# 5# B/L 2x10   Chair RDL 2x10 6# B/L 2x10    RDL 2x10 6# B/L 2x10    RDL 2x10 NV 6# B/L 2x10    RDL 2x10  NV increase to 10-15Ibs   Ther Activity                                 Gait Training                                 Modalities                                                                   "

## 2024-03-14 NOTE — LETTER
2024      No Recipients    Patient: Key Pinto   YOB: 1995   Date of Visit: 3/14/2024     Encounter Diagnosis     ICD-10-CM    1. Acute midline thoracic back pain  M54.6           Dear Dr. Martin:    Thank you for your recent referral of Key Pinto. Please review the attached evaluation summary from Key's recent visit.     Please verify that you agree with the plan of care by signing the attached order.     If you have any questions or concerns, please do not hesitate to call.     I sincerely appreciate the opportunity to share in the care of one of your patients and hope to have another opportunity to work with you in the near future.       Sincerely,    Janna Lopez, PT      Referring Provider:      I certify that I have read the below Plan of Care and certify the need for these services furnished under this plan of treatment while under my care.                    CHESTER Fletcher  [address] [address]  Via Fax: [phone]          Daily Note     Today's date: 3/14/2024  Patient name: Key Pinto  : 1995  MRN: [id number]  Referring provider: Ilana Martin*  Dx: No diagnosis found.               Subjective: ***      Objective: See treatment diary below      Assessment: Tolerated treatment {Tolerated treatment :2003909726}. Patient {assessment:7975854986}      Plan: {PLAN:3284230588}     Precautions: History reviewed. No pertinent past medical history. T7-T8 compression fracture   Date 2/15 2/20 2/23 2/28 3/1 3/4 3/6    Visit # 7 8 9 10 11 12 13    FOTO X          Re-eval X                Manuals 2/15 2/20 2/23 2/28 3/1 3/4 3/6    P to A L/S     Right Rib 3-5 grade 3-4   Right Rib 3-5 grade 3-4 Right Rib 3-5 grade 3-4               STM  paraspinalis NP          MFD     FH 5 min  FH 5 min    Neuro Re-Ed           TA hold NP          TA 90/90 hold with alt arms NV          TA hold with MB flex           Dead bug          "  Wall Ts, Ys  Prone 3 x10  Prone 3x10 Prone 2# 3x10 added Is NT Prone 3x10 Prone 3x10     Prone press ups       5''x10    Quad donkey kicks           Quad Fire hydtrant           TA hold with March  OH hold 5# b/L w/ march OH hold 6# b/L w/ march OH hold 6# b/L w/ march NV OH hold 6# b/l w/ march  2x10     Rows   Fwd lean 6# 2x10 Fwd lean 6# 2x10       Push up   Wall  2x10 Wall  2x10  NV Table push ups 2x10     Quad T rot       10''x10    Cat camel       10''x10    Ther Ex           UBE or BIke 3'/3' 3'/3' 3'/3' 3'/3' 3'/3' 3'/3' 3'/3'    B chest press   BTB 3x10 BTB 3x10       Tband Ws  GTB 3x10 GTB into OH lift 3x10 GTB into OH lift 3x10       LTR           Bilateral ER/no monies ER Vignesh  5# b/l ER Vignesh  5# b/l 2x10 BTB row to B/l ER 3x10 BTB row to B/l ER 3x10 BTB row to B/l ER 3x10 BTB row to B/L ER 3x10     H abduction    GTB to OH 3x10 GTB to OH 3x10 NV GTB to OH 3x10     Supine towel roll at T4-T5 w/overhead flexion and arm circles       X10 ea                           Open book stretch      X10 ea 10''x10    Bridges      X10 w/overhead flexion     SLR           Clamshells           Tband H,M,L rows [place] 15# rows  10# ext 2x10 ea  [place] 15# rows  10# ext 2x10 ea  Vignesh 15# rows  10# ext 2x10 ea  [place] 15# rows  10# ext 2x10 ea  NV Vignesh 15# rows  10# ext 3x10 ea      Pall off press Chest  press 8# NV OH press 6# B/L OH press 6# B/L NV OH press 6# B/L     T/S AROM ext, Sb, rot 5''x10 5''x15 5''x15 5\"x15 5\"x15      OH lift, Floor to chest 10#-15# 5# B/L 2x10   Chair RDL 2x10 6# B/L 2x10    RDL 2x10 6# B/L 2x10    RDL 2x10 NV 6# B/L 2x10    RDL 2x10     Ther Activity                                 Gait Training                                 Modalities                                                                      "

## 2024-03-21 ENCOUNTER — OFFICE VISIT (OUTPATIENT)
Dept: PHYSICAL THERAPY | Facility: CLINIC | Age: 29
End: 2024-03-21
Payer: COMMERCIAL

## 2024-03-21 DIAGNOSIS — M54.6 ACUTE MIDLINE THORACIC BACK PAIN: Primary | ICD-10-CM

## 2024-03-21 PROCEDURE — 97112 NEUROMUSCULAR REEDUCATION: CPT

## 2024-03-21 PROCEDURE — 97110 THERAPEUTIC EXERCISES: CPT

## 2024-03-21 NOTE — PROGRESS NOTES
"Daily Note     Today's date: 3/21/2024  Patient name: Key Pinto  : 1995  MRN: [id number]  Referring provider: Ilana Martin*  Dx:   Encounter Diagnosis     ICD-10-CM    1. Acute midline thoracic back pain  M54.6                      Subjective: Patient states that she goes back to work on . She states she still has pain near scap in upper R T-S but it has not increased.      Objective: See treatment diary below      Assessment: Tolerated treatment well. Session focused on self mobilizations, and T-S mobility. Patient advised to practice self mobs and T-S mobility activities at home. Good tolerance to progression with strengthening. Patient demonstrated fatigue post treatment and would benefit from continued PT to reach PLOF.      Plan: Continue per plan of care.      Precautions: History reviewed. No pertinent past medical history. T7-T8 compression fracture   Date 3/21   2/28 3/1 3/4 3/6 3/14   Visit #    10 11 12 13 14   FOTO           Re-eval                 Manuals 3/21   2/28 3/1 3/4 3/6 3/14   P to A L/S     Right Rib 3-5 grade 3-4   Right Rib 3-5 grade 3-4 Right Rib 3-5 grade 3-4 Right Scap grade 5 x1, rib mobilization gr 3-4              STM  paraspinalis           MFD     FH 5 min  FH 5 min    Neuro Re-Ed           TA hold           TA 90/90 hold with alt arms           TA hold with MB flex           Dead bug           Wall Ts, Ys  Slide 3x10 T, Y for stretching   NT Prone 3x10 Prone 3x10  YS and Ws 3x10   Prone press ups       5''x10    Quad donkey kicks           Quad Fire hydtrant           TA hold with #  2x10   OH hold 6# b/L w/ march NV OH hold 6# b/l w/ march  2x10  NV   Rows    Fwd lean 6# 2x10       Push up    Wall  2x10  NV Table push ups 2x10     Quad T rot 2x10, 3\"      10''x10 10x10'', thread the needle 10x10''   Cat camel       10''x10    Chicken wings  3x10          T-S seated ROT @ corner of plinth w/ cane to R only 2x10, 5\"                     Ther Ex " "          UBE or BIke 3'/3'    3'/3' 3'/3' 3'/3' 3'/3' 3'x3'   B chest press    BTB 3x10       Tband Ws    GTB into OH lift 3x10       LTR           Bilateral ER/no monies BTB row to B/L ER 3x10   BTB row to B/l ER 3x10 BTB row to B/l ER 3x10 BTB row to B/L ER 3x10  BTB row to B/L ER 3x10   H abduction     GTB to OH 3x10 NV GTB to OH 3x10     Supine towel roll at T4-T5 w/overhead flexion and arm circles  Seated self mob against chair reaching  2x10     X10 ea  Seated 2x10 reaching                         Open book stretch      X10 ea 10''x10    Bridges      X10 w/overhead flexion     SLR           Clamshells           Tband H,M,L rows Mid rows 10/#20, 10/#15,    Rows   25#/20    Face pulls (H) - #15/10, #10/10     Vignesh 15# rows  10# ext 2x10 ea  NV [place] 15# rows  10# ext 3x10 ea   NV increase to 20Ibs as tolerated for rows   Pall off press OH press 10Ibs  2x10   OH press 6# B/L NV OH press 6# B/L  OH press 10Ibs   T/S AROM ext, Sb, rot    5\"x15 5\"x15      OH lift, Floor to chest 2x10, 10# ea   6# B/L 2x10    RDL 2x10 NV 6# B/L 2x10    RDL 2x10  NV increase to 10-15Ibs   Ther Activity                                 Gait Training                                 Modalities                                       "

## 2024-03-22 ENCOUNTER — APPOINTMENT (OUTPATIENT)
Dept: PHYSICAL THERAPY | Facility: CLINIC | Age: 29
End: 2024-03-22
Payer: COMMERCIAL

## 2024-03-28 ENCOUNTER — OFFICE VISIT (OUTPATIENT)
Dept: PHYSICAL THERAPY | Facility: CLINIC | Age: 29
End: 2024-03-28
Payer: COMMERCIAL

## 2024-03-28 DIAGNOSIS — M54.6 ACUTE MIDLINE THORACIC BACK PAIN: Primary | ICD-10-CM

## 2024-03-28 PROCEDURE — 97112 NEUROMUSCULAR REEDUCATION: CPT

## 2024-03-28 PROCEDURE — 97110 THERAPEUTIC EXERCISES: CPT

## 2024-03-28 NOTE — LETTER
2024      No Recipients    Patient: Key Pinto   YOB: 1995   Date of Visit: 3/28/2024     Encounter Diagnosis     ICD-10-CM    1. Acute midline thoracic back pain  M54.6           Dear Dr. Martin:    Thank you for your recent referral of Key Pinto. Please review the attached evaluation summary from Key's recent visit.     Please verify that you agree with the plan of care by signing the attached order.     If you have any questions or concerns, please do not hesitate to call.     I sincerely appreciate the opportunity to share in the care of one of your patients and hope to have another opportunity to work with you in the near future.       Sincerely,    Janna Lopez, PT      Referring Provider:      I certify that I have read the below Plan of Care and certify the need for these services furnished under this plan of treatment while under my care.                    CHESTER Fletcher  [address] [address]  Via Fax: [phone]          PT Discharge    Today's date: 3/28/2024  Patient name: Key Pinto  : 1995  MRN: [id number]  Referring provider: Ilana Martin*  Dx:   Encounter Diagnosis     ICD-10-CM    1. Acute midline thoracic back pain  M54.6           Start Time: 1000  Stop Time: 1040  Total time in clinic (min): 40 minutes    Assessment details:   Key is a 27 yo female presenting to OP PT secondary to for RE hx of T/S compression fracture with onset 24, and current thoracic discomfort. Pt demonstrates near normal strength in B/L UE strength and normal ROM with T/S AROM/PROM for all ranges excep Left sidebneding.  Pt reports tolerance to work duties has improved and abiltiy to lift up to 20# increased. Exercises were updated focusing on pain modulation, strengthening, and improving lifting mechanics to improve return to work. Pt will be Dcd to [facility] which was reviewed this          FUNCTIONAL  TESTING  Pushing/pulling  20#, B/L 2x10 reps  Standing 3-4 hrs  Repeated forward bending 20 reps  Sustained forward bending NA   Lifting from Ground to waist 20#, 20 reps   Lifting from Ground to shoulder 20#, 10-20 reps (pain with 15# after 5 reps)  Overhead lifting 10-15#, 10-20 reps       Symptom irritability: moderate  Understanding of Dx/Px/POC: excellent  Goals  Pt will demonstrate [place] with progressive home exercise program to improve carryover and decrease recurrence of symptoms. (MET)  Pt will demonstrate 20% improvement in FOTO score to increase tolerance to functional activities (MET)  Pt will demonstrate 10-20 deg improvement in lumbar AROM to increase tolerance to reaching for items off the floor and improve ease with turning MET all WNL  Pt will demonstrate tolerance to lifting medium weights (up to 15Ibs) with min to no discomfort to improve ease with carrying groceries MET  Pt will demonstrate 5/5 in LE strength to allow for improved tolerance to prolonged amb/standing MET  Pt will demonstrate [place] with progressive home exercise program to assist with PT carry over and prevent recurrence of symptoms in the future  (MET)  Pt will demonstrate 4+/5 in UE strength to allow for improved tolerance to lifting items overhead. MET  Pt will demonstrate 4+/5 in periscap strength to improve posture in sitting. (DC)      Plan  DISCHARGE  Treatment plan discussed with: patient        Subjective Evaluation  RE: 3/14/24  Pt had returned to work this past Friday and was working the past 2-3 days. She notes back pain has been significantly better but now notices come discomfort/tightness along medial R scapula. She said she was able to tolerate activity at work fairly well but had some difficulty due to scapular pain. .     Active Range of Motion   Cervical/Thoracic Spine       Thoracic  Flexion WNL  Extension mod  Left lateral flexion:  with pain Restriction level: min  Right lateral flexion:   WNL  Left rotation:  WNL  Right rotation:  WNL  Normal active range of motion    Right Shoulder   Normal active range of motion    Lumbar   Flexion:  Restriction level: WNL  Extension:  Restriction level: WNL  Left lateral flexion:  Restriction level: minimal  Right lateral flexion:  Restriction level: WNL  Left rotation:  Restriction level: WNL   Right rotation:  Restriction level: WNL    Strength/Myotome Testing     Left Shoulder     Planes of Motion   Flexion: 5  Extension: 5   Abduction: 5  External rotation at 90°: 4 +  External rotation BTH: 4 +  Internal rotation at 90°: 4 +  Internal rotation BTB: 4 +    Isolated Muscles   Biceps: 5  Lower trapezius: 4+  Middle trapezius: 4+  Rhomboids: 4  Triceps:5    Right Shoulder     Planes of Motion   Flexion: 5  Extension: 5  Abduction: 5  External rotation at 90°: 4 +  External rotation BTH: 4 +  Internal rotation at 90°: 4+   Internal rotation BTB: 4+    Isolated Muscles   Biceps: 5  Lower trapezius: 4+  Middle trapezius: 4+  Rhomboids: 4  Triceps: 5    JOB REQUIREMENTS:  Standing 8-10 hours   Repeated unassisted lifting of 20Ibs overhead   Pushing/pulling 20Ibs    FUNCTIONAL TESTING 3/14/24 RE:    Pushing/pulling  20#, B/L 2x10 reps  Standing 3-4 hrs  Repeated forward bending 20 reps  Sustained forward bending NA   Lifting from Ground to waist 20IB, 20 reps   Lifting from Ground to shoulder 20IBs, 10-20 reps (pain with 15# after 5 reps)  Overhead lifting 10-15#, 10-20 reps        Manuals 3/21 3/28  2/28 3/1 3/4 3/6 3/14   P to A L/S     Right Rib 3-5 grade 3-4   Right Rib 3-5 grade 3-4 Right Rib 3-5 grade 3-4 Right Scap grade 5 x1, rib mobilization gr 3-4              STM  paraspinalis           MFD     FH 5 min  FH 5 min    Neuro Re-Ed           TA hold           TA 90/90 hold with alt arms           TA hold with MB flex           Dead bug           Wall Ts, Ys  Slide 3x10 T, Y for stretching Slide 3x10 T, Y for stretching  NT Prone 3x10 Prone 3x10  YS and Ws 3x10  "  Prone press ups       5''x10    Quad donkey kicks           Quad Fire hydtrant           TA hold with [place] 7#  2x10   OH hold 6# b/L w/ march NV OH hold 6# b/l w/ march  2x10  NV   Rows    Fwd lean 6# 2x10       Push up    Wall  2x10  NV Table push ups 2x10     Quad T rot 2x10, 3\"      10''x10 10x10'', thread the needle 10x10''   Cat camel       10''x10    Chicken wings  3x10 3x10         T-S seated ROT @ corner of plinth w/ cane to R only 2x10, 5\"                     Ther Ex           UBE or BIke 3'/3'    3'/3' 3'/3' 3'/3' 3'/3' 3'x3'   B chest press    BTB 3x10       Tband Ws    GTB into OH lift 3x10       LTR           Bilateral ER/no monies BTB row to B/L ER 3x10 MTB 3x10  BTB row to B/l ER 3x10 BTB row to B/l ER 3x10 BTB row to B/L ER 3x10  BTB row to B/L ER 3x10   H abduction     GTB to OH 3x10 NV GTB to OH 3x10     Supine towel roll at T4-T5 w/overhead flexion and arm circles  Seated self mob against chair reaching  2x10 Seated self mob against chair reaching  2x10    X10 ea  Seated 2x10 reaching                         Open book stretch      X10 ea 10''x10    Bridges      X10 w/overhead flexion     SLR           Clamshells           Tband H,M,L rows Mid rows 10/#20, 10/#15,    Rows   25#/20    Face pulls (H) - #15/10, #10/10   Reviewed with MTB and Gray band   [place] 15# rows  10# ext 2x10 ea  NV Vignesh 15# rows  10# ext 3x10 ea   NV increase to 20Ibs as tolerated for rows   Pall off press OH press 10Ibs  2x10 OH press 10Ibs  2x10  OH press 6# B/L NV OH press 6# B/L  OH press 10Ibs   T/S AROM ext, Sb, rot  2x10  5\"x15 5\"x15      OH lift, Floor to chest 2x10, 10# ea 2x10, 10# ea added to HEP  6# B/L 2x10    RDL 2x10 NV 6# B/L 2x10    RDL 2x10  NV increase to 10-15Ibs   Ther Activity                                 Gait Training                                 Modalities                                                      "

## 2024-03-28 NOTE — PROGRESS NOTES
PT Discharge    Today's date: 3/28/2024  Patient name: Key Pinto  : 1995  MRN: [id number]  Referring provider: Ilana Martin*  Dx:   Encounter Diagnosis     ICD-10-CM    1. Acute midline thoracic back pain  M54.6           Start Time: 1000  Stop Time: 1040  Total time in clinic (min): 40 minutes    Assessment details:   Key is a 27 yo female presenting to OP PT secondary to for RE hx of T/S compression fracture with onset 24, and current thoracic discomfort. Pt demonstrates near normal strength in B/L UE strength and normal ROM with T/S AROM/PROM for all ranges excep Left sidebneding.  Pt reports tolerance to work duties has improved and abiltiy to lift up to 20# increased. Exercises were updated focusing on pain modulation, strengthening, and improving lifting mechanics to improve return to work. Pt will be Dcd to [facility] which was reviewed this          FUNCTIONAL TESTING  Pushing/pulling  20#, B/L 2x10 reps  Standing 3-4 hrs  Repeated forward bending 20 reps  Sustained forward bending NA   Lifting from Ground to waist 20#, 20 reps   Lifting from Ground to shoulder 20#, 10-20 reps (pain with 15# after 5 reps)  Overhead lifting 10-15#, 10-20 reps       Symptom irritability: moderate  Understanding of Dx/Px/POC: excellent  Goals  Pt will demonstrate [place] with progressive home exercise program to improve carryover and decrease recurrence of symptoms. (MET)  Pt will demonstrate 20% improvement in FOTO score to increase tolerance to functional activities (MET)  Pt will demonstrate 10-20 deg improvement in lumbar AROM to increase tolerance to reaching for items off the floor and improve ease with turning MET all WNL  Pt will demonstrate tolerance to lifting medium weights (up to 15Ibs) with min to no discomfort to improve ease with carrying groceries MET  Pt will demonstrate 5/5 in LE strength to allow for improved tolerance to prolonged amb/standing MET  Pt will demonstrate  [place] with progressive home exercise program to assist with PT carry over and prevent recurrence of symptoms in the future  (MET)  Pt will demonstrate 4+/5 in UE strength to allow for improved tolerance to lifting items overhead. MET  Pt will demonstrate 4+/5 in periscap strength to improve posture in sitting. (DC)      Plan  DISCHARGE  Treatment plan discussed with: patient        Subjective Evaluation  RE: 3/14/24  Pt had returned to work this past Friday and was working the past 2-3 days. She notes back pain has been significantly better but now notices come discomfort/tightness along medial R scapula. She said she was able to tolerate activity at work fairly well but had some difficulty due to scapular pain. .     Active Range of Motion   Cervical/Thoracic Spine       Thoracic  Flexion WNL  Extension mod  Left lateral flexion:  with pain Restriction level: min  Right lateral flexion:  WNL  Left rotation:  WNL  Right rotation:  WNL  Normal active range of motion    Right Shoulder   Normal active range of motion    Lumbar   Flexion:  Restriction level: WNL  Extension:  Restriction level: WNL  Left lateral flexion:  Restriction level: minimal  Right lateral flexion:  Restriction level: WNL  Left rotation:  Restriction level: WNL   Right rotation:  Restriction level: WNL    Strength/Myotome Testing     Left Shoulder     Planes of Motion   Flexion: 5  Extension: 5   Abduction: 5  External rotation at 90°: 4 +  External rotation BTH: 4 +  Internal rotation at 90°: 4 +  Internal rotation BTB: 4 +    Isolated Muscles   Biceps: 5  Lower trapezius: 4+  Middle trapezius: 4+  Rhomboids: 4  Triceps:5    Right Shoulder     Planes of Motion   Flexion: 5  Extension: 5  Abduction: 5  External rotation at 90°: 4 +  External rotation BTH: 4 +  Internal rotation at 90°: 4+   Internal rotation BTB: 4+    Isolated Muscles   Biceps: 5  Lower trapezius: 4+  Middle trapezius: 4+  Rhomboids: 4  Triceps: 5    JOB  "REQUIREMENTS:  Standing 8-10 hours   Repeated unassisted lifting of 20Ibs overhead   Pushing/pulling 20Ibs    FUNCTIONAL TESTING 3/14/24 RE:    Pushing/pulling  20#, B/L 2x10 reps  Standing 3-4 hrs  Repeated forward bending 20 reps  Sustained forward bending NA   Lifting from Ground to waist 20IB, 20 reps   Lifting from Ground to shoulder 20IBs, 10-20 reps (pain with 15# after 5 reps)  Overhead lifting 10-15#, 10-20 reps        Manuals 3/21 3/28  2/28 3/1 3/4 3/6 3/14   P to A L/S     Right Rib 3-5 grade 3-4   Right Rib 3-5 grade 3-4 Right Rib 3-5 grade 3-4 Right Scap grade 5 x1, rib mobilization gr 3-4              STM  paraspinalis           MFD     FH 5 min  FH 5 min    Neuro Re-Ed           TA hold           TA 90/90 hold with alt arms           TA hold with MB flex           Dead bug           Wall Ts, Ys  Slide 3x10 T, Y for stretching Slide 3x10 T, Y for stretching  NT Prone 3x10 Prone 3x10  YS and Ws 3x10   Prone press ups       5''x10    Quad donkey kicks           Quad Fire hydtrant           TA hold with [place] 7#  2x10   OH hold 6# b/L w/ march NV OH hold 6# b/l w/ march  2x10  NV   Rows    Fwd lean 6# 2x10       Push up    Wall  2x10  NV Table push ups 2x10     Quad T rot 2x10, 3\"      10''x10 10x10'', thread the needle 10x10''   Cat camel       10''x10    Chicken wings  3x10 3x10         T-S seated ROT @ corner of int w/ cane to R only 2x10, 5\"                     Ther Ex           UBE or BIke 3'/3'    3'/3' 3'/3' 3'/3' 3'/3' 3'x3'   B chest press    BTB 3x10       Tband Ws    GTB into OH lift 3x10       LTR           Bilateral ER/no monies BTB row to B/L ER 3x10 MTB 3x10  BTB row to B/l ER 3x10 BTB row to B/l ER 3x10 BTB row to B/L ER 3x10  BTB row to B/L ER 3x10   H abduction     GTB to OH 3x10 NV GTB to OH 3x10     Supine towel roll at T4-T5 w/overhead flexion and arm circles  Seated self mob against chair reaching  2x10 Seated self mob against chair reaching  2x10    X10 ea  Seated 2x10 reaching " "                        Open book stretch      X10 ea 10''x10    Bridges      X10 w/overhead flexion     SLR           Clamshells           Tband H,M,L rows Mid rows 10/#20, 10/#15,    Rows   25#/20    Face pulls (H) - #15/10, #10/10   Reviewed with MTB and Gray band   [place] 15# rows  10# ext 2x10 ea  NV [place] 15# rows  10# ext 3x10 ea   NV increase to 20Ibs as tolerated for rows   Pall off press OH press 10Ibs  2x10 OH press 10Ibs  2x10  OH press 6# B/L NV OH press 6# B/L  OH press 10Ibs   T/S AROM ext, Sb, rot  2x10  5\"x15 5\"x15      OH lift, Floor to chest 2x10, 10# ea 2x10, 10# ea added to HEP  6# B/L 2x10    RDL 2x10 NV 6# B/L 2x10    RDL 2x10  NV increase to 10-15Ibs   Ther Activity                                 Gait Training                                 Modalities                                      "

## 2025-03-22 ENCOUNTER — TELEPHONE (OUTPATIENT)
Dept: OTHER | Facility: OTHER | Age: 30
End: 2025-03-22

## 2025-03-25 NOTE — TELEPHONE ENCOUNTER
Called patient to schedule, states she is pregnant LMP 2/19/25, currently inquiring with a midwife and unsure if she would like to schedule. Will call back to schedule D&V once she is decided.